# Patient Record
Sex: MALE | Race: WHITE | Employment: OTHER
[De-identification: names, ages, dates, MRNs, and addresses within clinical notes are randomized per-mention and may not be internally consistent; named-entity substitution may affect disease eponyms.]

---

## 2022-03-18 PROBLEM — N39.41 URGE INCONTINENCE: Status: ACTIVE | Noted: 2019-04-10

## 2022-03-19 PROBLEM — R35.0 URINARY FREQUENCY: Status: ACTIVE | Noted: 2019-04-09

## 2022-03-19 PROBLEM — Z86.69 HISTORY OF COMPLEX PARTIAL EPILEPSY: Status: ACTIVE | Noted: 2017-03-09

## 2022-03-19 PROBLEM — L08.9 INFECTED SEBACEOUS CYST OF SKIN: Status: ACTIVE | Noted: 2020-11-12

## 2022-03-19 PROBLEM — L72.3 INFECTED SEBACEOUS CYST OF SKIN: Status: ACTIVE | Noted: 2020-11-12

## 2022-03-19 PROBLEM — G31.84 MCI (MILD COGNITIVE IMPAIRMENT): Status: ACTIVE | Noted: 2018-04-13

## 2022-03-19 PROBLEM — I25.2 HISTORY OF ST ELEVATION MYOCARDIAL INFARCTION (STEMI): Status: ACTIVE | Noted: 2019-07-24

## 2022-03-19 PROBLEM — R39.12 WEAK URINARY STREAM: Status: ACTIVE | Noted: 2019-04-10

## 2022-03-19 PROBLEM — R07.9 CHEST PAIN: Status: ACTIVE | Noted: 2019-04-10

## 2022-03-19 PROBLEM — N40.1 ENLARGED PROSTATE WITH LOWER URINARY TRACT SYMPTOMS (LUTS): Status: ACTIVE | Noted: 2019-04-09

## 2022-03-19 PROBLEM — G60.9 IDIOPATHIC PERIPHERAL NEUROPATHY: Status: ACTIVE | Noted: 2017-03-09

## 2022-03-19 PROBLEM — Z97.8 CHRONIC INDWELLING FOLEY CATHETER: Status: ACTIVE | Noted: 2019-11-18

## 2022-03-19 PROBLEM — A60.00 GENITAL HERPES: Status: ACTIVE | Noted: 2019-04-10

## 2022-03-20 PROBLEM — R35.1 NOCTURIA: Status: ACTIVE | Noted: 2019-04-10

## 2022-03-20 PROBLEM — R06.02 SHORTNESS OF BREATH: Status: ACTIVE | Noted: 2019-04-10

## 2022-03-20 PROBLEM — I48.91 ATRIAL FIBRILLATION WITH RAPID VENTRICULAR RESPONSE (HCC): Status: ACTIVE | Noted: 2019-07-24

## 2023-01-01 ENCOUNTER — APPOINTMENT (OUTPATIENT)
Facility: HOSPITAL | Age: 83
DRG: 698 | End: 2023-01-01
Payer: MEDICARE

## 2023-01-01 ENCOUNTER — HOSPITAL ENCOUNTER (INPATIENT)
Facility: HOSPITAL | Age: 83
LOS: 7 days | DRG: 698 | End: 2023-06-20
Attending: STUDENT IN AN ORGANIZED HEALTH CARE EDUCATION/TRAINING PROGRAM | Admitting: INTERNAL MEDICINE
Payer: MEDICARE

## 2023-01-01 ENCOUNTER — APPOINTMENT (OUTPATIENT)
Facility: HOSPITAL | Age: 83
DRG: 698 | End: 2023-01-01
Attending: INTERNAL MEDICINE
Payer: MEDICARE

## 2023-01-01 VITALS
DIASTOLIC BLOOD PRESSURE: 50 MMHG | WEIGHT: 158.5 LBS | HEART RATE: 66 BPM | TEMPERATURE: 99 F | SYSTOLIC BLOOD PRESSURE: 97 MMHG | OXYGEN SATURATION: 98 % | BODY MASS INDEX: 24.02 KG/M2 | RESPIRATION RATE: 28 BRPM | HEIGHT: 68 IN

## 2023-01-01 DIAGNOSIS — N39.0 URINARY TRACT INFECTION WITHOUT HEMATURIA, SITE UNSPECIFIED: Primary | ICD-10-CM

## 2023-01-01 DIAGNOSIS — I25.9 CHEST PAIN DUE TO MYOCARDIAL ISCHEMIA, UNSPECIFIED ISCHEMIC CHEST PAIN TYPE: ICD-10-CM

## 2023-01-01 DIAGNOSIS — I48.91 ATRIAL FIBRILLATION WITH RAPID VENTRICULAR RESPONSE (HCC): ICD-10-CM

## 2023-01-01 DIAGNOSIS — R06.02 SHORTNESS OF BREATH: ICD-10-CM

## 2023-01-01 DIAGNOSIS — A41.9 SEPTICEMIA (HCC): ICD-10-CM

## 2023-01-01 LAB
ACCESSION NUMBER, LLC1M: ABNORMAL
ACINETOBACTER CALCOAC BAUMANNII COMPLEX BY PCR: NOT DETECTED
ALBUMIN SERPL-MCNC: 2.2 G/DL (ref 3.4–5)
ALBUMIN SERPL-MCNC: 2.3 G/DL (ref 3.4–5)
ALBUMIN SERPL-MCNC: 2.7 G/DL (ref 3.4–5)
ALBUMIN SERPL-MCNC: 3.7 G/DL (ref 3.4–5)
ALBUMIN SERPL-MCNC: 3.7 G/DL (ref 3.4–5)
ALBUMIN SERPL-MCNC: 4.1 G/DL (ref 3.4–5)
ALBUMIN SERPL-MCNC: 4.3 G/DL (ref 3.4–5)
ALBUMIN/GLOB SERPL: 0.6 (ref 0.8–1.7)
ALBUMIN/GLOB SERPL: 0.6 (ref 0.8–1.7)
ALBUMIN/GLOB SERPL: 1.2 (ref 0.8–1.7)
ALBUMIN/GLOB SERPL: 1.2 (ref 0.8–1.7)
ALBUMIN/GLOB SERPL: 1.3 (ref 0.8–1.7)
ALP SERPL-CCNC: 102 U/L (ref 45–117)
ALP SERPL-CCNC: 109 U/L (ref 45–117)
ALP SERPL-CCNC: 144 U/L (ref 45–117)
ALP SERPL-CCNC: 162 U/L (ref 45–117)
ALP SERPL-CCNC: 93 U/L (ref 45–117)
ALT SERPL-CCNC: 10 U/L (ref 16–61)
ALT SERPL-CCNC: 11 U/L (ref 16–61)
ALT SERPL-CCNC: 11 U/L (ref 16–61)
ALT SERPL-CCNC: 12 U/L (ref 16–61)
ALT SERPL-CCNC: 13 U/L (ref 16–61)
ANION GAP SERPL CALC-SCNC: 10 MMOL/L (ref 3–18)
ANION GAP SERPL CALC-SCNC: 12 MMOL/L (ref 3–18)
ANION GAP SERPL CALC-SCNC: 13 MMOL/L (ref 3–18)
ANION GAP SERPL CALC-SCNC: 14 MMOL/L (ref 3–18)
ANION GAP SERPL CALC-SCNC: 15 MMOL/L (ref 3–18)
ANION GAP SERPL CALC-SCNC: 16 MMOL/L (ref 3–18)
ANION GAP SERPL CALC-SCNC: 6 MMOL/L (ref 3–18)
ANION GAP SERPL CALC-SCNC: 8 MMOL/L (ref 3–18)
ANION GAP SERPL CALC-SCNC: 8 MMOL/L (ref 3–18)
ANION GAP SERPL CALC-SCNC: 9 MMOL/L (ref 3–18)
APPEARANCE UR: ABNORMAL
AST SERPL-CCNC: 48 U/L (ref 10–38)
AST SERPL-CCNC: 58 U/L (ref 10–38)
AST SERPL-CCNC: 62 U/L (ref 10–38)
AST SERPL-CCNC: 64 U/L (ref 10–38)
AST SERPL-CCNC: 69 U/L (ref 10–38)
B FRAGILIS DNA BLD POS QL NAA+NON-PROBE: NOT DETECTED
BACTERIA SPEC CULT: ABNORMAL
BACTERIA SPEC CULT: NORMAL
BACTERIA SPEC CULT: NORMAL
BACTERIA URNS QL MICRO: ABNORMAL /HPF
BASOPHILS # BLD: 0 K/UL (ref 0–0.1)
BASOPHILS # BLD: 0 K/UL (ref 0–0.1)
BASOPHILS NFR BLD: 0 % (ref 0–2)
BASOPHILS NFR BLD: 0 % (ref 0–2)
BILIRUB SERPL-MCNC: 0.5 MG/DL (ref 0.2–1)
BILIRUB SERPL-MCNC: 0.5 MG/DL (ref 0.2–1)
BILIRUB SERPL-MCNC: 0.7 MG/DL (ref 0.2–1)
BILIRUB SERPL-MCNC: 0.8 MG/DL (ref 0.2–1)
BILIRUB SERPL-MCNC: 1 MG/DL (ref 0.2–1)
BILIRUB UR QL: NEGATIVE
BIOFIRE TEST COMMENT: ABNORMAL
BUN SERPL-MCNC: 107 MG/DL (ref 7–18)
BUN SERPL-MCNC: 119 MG/DL (ref 7–18)
BUN SERPL-MCNC: 120 MG/DL (ref 7–18)
BUN SERPL-MCNC: 125 MG/DL (ref 7–18)
BUN SERPL-MCNC: 140 MG/DL (ref 7–18)
BUN SERPL-MCNC: 77 MG/DL (ref 7–18)
BUN SERPL-MCNC: 81 MG/DL (ref 7–18)
BUN SERPL-MCNC: 90 MG/DL (ref 7–18)
BUN SERPL-MCNC: 92 MG/DL (ref 7–18)
BUN SERPL-MCNC: 97 MG/DL (ref 7–18)
BUN/CREAT SERPL: 11 (ref 12–20)
BUN/CREAT SERPL: 13 (ref 12–20)
BUN/CREAT SERPL: 15 (ref 12–20)
BUN/CREAT SERPL: 17 (ref 12–20)
BUN/CREAT SERPL: 18 (ref 12–20)
BUN/CREAT SERPL: 19 (ref 12–20)
BUN/CREAT SERPL: 20 (ref 12–20)
BUN/CREAT SERPL: 20 (ref 12–20)
BUN/CREAT SERPL: 23 (ref 12–20)
BUN/CREAT SERPL: 24 (ref 12–20)
C ALBICANS DNA BLD POS QL NAA+NON-PROBE: NOT DETECTED
C AURIS DNA BLD POS QL NAA+NON-PROBE: NOT DETECTED
C GATTII+NEOFOR DNA BLD POS QL NAA+N-PRB: NOT DETECTED
C GLABRATA DNA BLD POS QL NAA+NON-PROBE: NOT DETECTED
C KRUSEI DNA BLD POS QL NAA+NON-PROBE: NOT DETECTED
C PARAP DNA BLD POS QL NAA+NON-PROBE: DETECTED
C TROPICLS DNA BLD POS QL NAA+NON-PROBE: DETECTED
CALCIUM SERPL-MCNC: 7.9 MG/DL (ref 8.5–10.1)
CALCIUM SERPL-MCNC: 8 MG/DL (ref 8.5–10.1)
CALCIUM SERPL-MCNC: 8.2 MG/DL (ref 8.5–10.1)
CALCIUM SERPL-MCNC: 8.5 MG/DL (ref 8.5–10.1)
CALCIUM SERPL-MCNC: 8.6 MG/DL (ref 8.5–10.1)
CALCIUM SERPL-MCNC: 8.8 MG/DL (ref 8.5–10.1)
CALCIUM SERPL-MCNC: 8.8 MG/DL (ref 8.5–10.1)
CALCIUM SERPL-MCNC: 8.9 MG/DL (ref 8.5–10.1)
CALCIUM SERPL-MCNC: 9 MG/DL (ref 8.5–10.1)
CALCIUM SERPL-MCNC: 9 MG/DL (ref 8.5–10.1)
CC UR VC: ABNORMAL
CC UR VC: ABNORMAL
CHLORIDE SERPL-SCNC: 102 MMOL/L (ref 100–111)
CHLORIDE SERPL-SCNC: 110 MMOL/L (ref 100–111)
CHLORIDE SERPL-SCNC: 111 MMOL/L (ref 100–111)
CHLORIDE SERPL-SCNC: 113 MMOL/L (ref 100–111)
CHLORIDE SERPL-SCNC: 113 MMOL/L (ref 100–111)
CHLORIDE SERPL-SCNC: 114 MMOL/L (ref 100–111)
CHLORIDE SERPL-SCNC: 116 MMOL/L (ref 100–111)
CHLORIDE SERPL-SCNC: 119 MMOL/L (ref 100–111)
CHLORIDE SERPL-SCNC: 120 MMOL/L (ref 100–111)
CHLORIDE SERPL-SCNC: 122 MMOL/L (ref 100–111)
CK SERPL-CCNC: 38 U/L (ref 39–308)
CO2 SERPL-SCNC: 17 MMOL/L (ref 21–32)
CO2 SERPL-SCNC: 17 MMOL/L (ref 21–32)
CO2 SERPL-SCNC: 18 MMOL/L (ref 21–32)
CO2 SERPL-SCNC: 18 MMOL/L (ref 21–32)
CO2 SERPL-SCNC: 19 MMOL/L (ref 21–32)
CO2 SERPL-SCNC: 20 MMOL/L (ref 21–32)
CO2 SERPL-SCNC: 20 MMOL/L (ref 21–32)
CO2 SERPL-SCNC: 21 MMOL/L (ref 21–32)
CO2 SERPL-SCNC: 22 MMOL/L (ref 21–32)
CO2 SERPL-SCNC: 23 MMOL/L (ref 21–32)
COLOR UR: ABNORMAL
CREAT SERPL-MCNC: 12.5 MG/DL (ref 0.6–1.3)
CREAT SERPL-MCNC: 3.19 MG/DL (ref 0.6–1.3)
CREAT SERPL-MCNC: 3.97 MG/DL (ref 0.6–1.3)
CREAT SERPL-MCNC: 4.03 MG/DL (ref 0.6–1.3)
CREAT SERPL-MCNC: 5 MG/DL (ref 0.6–1.3)
CREAT SERPL-MCNC: 5.24 MG/DL (ref 0.6–1.3)
CREAT SERPL-MCNC: 5.66 MG/DL (ref 0.6–1.3)
CREAT SERPL-MCNC: 6.25 MG/DL (ref 0.6–1.3)
CREAT SERPL-MCNC: 8.01 MG/DL (ref 0.6–1.3)
CREAT SERPL-MCNC: 9.43 MG/DL (ref 0.6–1.3)
DIFFERENTIAL METHOD BLD: ABNORMAL
DIFFERENTIAL METHOD BLD: ABNORMAL
E CLOAC COMP DNA BLD POS NAA+NON-PROBE: NOT DETECTED
E COLI DNA BLD POS QL NAA+NON-PROBE: NOT DETECTED
E FAECALIS DNA BLD POS QL NAA+NON-PROBE: NOT DETECTED
E FAECIUM DNA BLD POS QL NAA+NON-PROBE: NOT DETECTED
ECHO AO ROOT DIAM: 3.9 CM
ECHO AO ROOT INDEX: 2.12 CM/M2
ECHO AV AREA PEAK VELOCITY: 2.1 CM2
ECHO AV AREA VTI: 2 CM2
ECHO AV AREA/BSA PEAK VELOCITY: 1.1 CM2/M2
ECHO AV AREA/BSA VTI: 1.1 CM2/M2
ECHO AV MEAN GRADIENT: 3 MMHG
ECHO AV MEAN VELOCITY: 0.8 M/S
ECHO AV PEAK GRADIENT: 7 MMHG
ECHO AV PEAK VELOCITY: 1.3 M/S
ECHO AV VELOCITY RATIO: 0.77
ECHO AV VTI: 25.7 CM
ECHO BSA: 1.84 M2
ECHO BSA: 1.84 M2
ECHO EST RA PRESSURE: 15 MMHG
ECHO LA DIAMETER INDEX: 2.5 CM/M2
ECHO LA DIAMETER: 4.6 CM
ECHO LA TO AORTIC ROOT RATIO: 1.18
ECHO LA VOL 2C: 82 ML (ref 18–58)
ECHO LA VOL 2C: 83 ML (ref 18–58)
ECHO LA VOL 4C: 101 ML (ref 18–58)
ECHO LA VOL 4C: 95 ML (ref 18–58)
ECHO LA VOL BP: 96 ML (ref 18–58)
ECHO LA VOL/BSA BIPLANE: 52 ML/M2 (ref 16–34)
ECHO LA VOLUME AREA LENGTH: 101 ML
ECHO LA VOLUME INDEX AREA LENGTH: 55 ML/M2 (ref 16–34)
ECHO LV E' LATERAL VELOCITY: 13 CM/S
ECHO LV E' SEPTAL VELOCITY: 10 CM/S
ECHO LV EDV A2C: 119 ML
ECHO LV EDV A4C: 123 ML
ECHO LV EDV BP: 123 ML (ref 67–155)
ECHO LV EDV INDEX A4C: 67 ML/M2
ECHO LV EDV INDEX BP: 67 ML/M2
ECHO LV EDV NDEX A2C: 65 ML/M2
ECHO LV EJECTION FRACTION A2C: 44 %
ECHO LV EJECTION FRACTION A4C: 48 %
ECHO LV EJECTION FRACTION BIPLANE: 46 % (ref 55–100)
ECHO LV ESV A2C: 67 ML
ECHO LV ESV A4C: 64 ML
ECHO LV ESV BP: 66 ML (ref 22–58)
ECHO LV ESV INDEX A2C: 36 ML/M2
ECHO LV ESV INDEX A4C: 35 ML/M2
ECHO LV ESV INDEX BP: 36 ML/M2
ECHO LV FRACTIONAL SHORTENING: 34 % (ref 28–44)
ECHO LV GLOBAL LONGITUDINAL STRAIN (GLS): -10.9 %
ECHO LV GLOBAL LONGITUDINAL STRAIN (GLS): -12.4 %
ECHO LV GLOBAL LONGITUDINAL STRAIN (GLS): -12.4 %
ECHO LV GLOBAL LONGITUDINAL STRAIN (GLS): -13.9 %
ECHO LV GLOBAL LONGITUDINAL STRAIN (GLS): -27.9 %
ECHO LV INTERNAL DIMENSION DIASTOLE INDEX: 3.04 CM/M2
ECHO LV INTERNAL DIMENSION DIASTOLIC: 5.6 CM (ref 4.2–5.9)
ECHO LV INTERNAL DIMENSION SYSTOLIC INDEX: 2.01 CM/M2
ECHO LV INTERNAL DIMENSION SYSTOLIC: 3.7 CM
ECHO LV ISOVOLUMETRIC RELAXATION TIME (IVRT): 88.5 MS
ECHO LV IVSD: 0.7 CM (ref 0.6–1)
ECHO LV MASS 2D: 152.3 G (ref 88–224)
ECHO LV MASS INDEX 2D: 82.8 G/M2 (ref 49–115)
ECHO LV POSTERIOR WALL DIASTOLIC: 0.8 CM (ref 0.6–1)
ECHO LV RELATIVE WALL THICKNESS RATIO: 0.29
ECHO LVOT AREA: 2.8 CM2
ECHO LVOT AV VTI INDEX: 0.74
ECHO LVOT DIAM: 1.9 CM
ECHO LVOT MEAN GRADIENT: 2 MMHG
ECHO LVOT PEAK GRADIENT: 4 MMHG
ECHO LVOT PEAK VELOCITY: 1 M/S
ECHO LVOT STROKE VOLUME INDEX: 29.4 ML/M2
ECHO LVOT SV: 54.1 ML
ECHO LVOT VTI: 19.1 CM
ECHO MV A VELOCITY: 0.5 M/S
ECHO MV E DECELERATION TIME (DT): 162.7 MS
ECHO MV E VELOCITY: 1.08 M/S
ECHO MV E/A RATIO: 2.16
ECHO MV E/E' LATERAL: 8.31
ECHO MV E/E' RATIO (AVERAGED): 9.55
ECHO MV E/E' SEPTAL: 10.8
ECHO PULMONARY ARTERY SYSTOLIC PRESSURE (PASP): 65 MMHG
ECHO PVEIN A DURATION: 85.6 MS
ECHO PVEIN A VELOCITY: 0.3 M/S
ECHO RA VOLUME: 24 ML
ECHO RA VOLUME: 24 ML
ECHO RIGHT VENTRICULAR SYSTOLIC PRESSURE (RVSP): 65 MMHG
ECHO RV FREE WALL PEAK S': 16 CM/S
ECHO RV GLOBAL SYSTOLIC STRAIN (GLS): -30.5 %
ECHO RV TAPSE: 3 CM (ref 1.7–?)
ECHO RV TAPSE: 3.1 CM (ref 1.7–?)
ECHO TRICUSPID ANNULAR PEAK SYSTOLIC VELOCITY: 16 CM/S
ECHO TV REGURGITANT MAX VELOCITY: 3.53 M/S
ECHO TV REGURGITANT PEAK GRADIENT: 50 MMHG
EKG ATRIAL RATE: 61 BPM
EKG DIAGNOSIS: NORMAL
EKG DIAGNOSIS: NORMAL
EKG P AXIS: 93 DEGREES
EKG P-R INTERVAL: 178 MS
EKG Q-T INTERVAL: 368 MS
EKG Q-T INTERVAL: 464 MS
EKG QRS DURATION: 104 MS
EKG QRS DURATION: 90 MS
EKG QTC CALCULATION (BAZETT): 467 MS
EKG QTC CALCULATION (BAZETT): 515 MS
EKG R AXIS: -15 DEGREES
EKG R AXIS: -40 DEGREES
EKG T AXIS: 108 DEGREES
EKG T AXIS: 118 DEGREES
EKG VENTRICULAR RATE: 118 BPM
EKG VENTRICULAR RATE: 61 BPM
ENTEROBACTERALES DNA BLD POS NAA+N-PRB: NOT DETECTED
EOSINOPHIL # BLD: 0 K/UL (ref 0–0.4)
EOSINOPHIL # BLD: 0 K/UL (ref 0–0.4)
EOSINOPHIL NFR BLD: 0 % (ref 0–5)
EOSINOPHIL NFR BLD: 0 % (ref 0–5)
EPITH CASTS URNS QL MICRO: ABNORMAL /LPF (ref 0–5)
ERYTHROCYTE [DISTWIDTH] IN BLOOD BY AUTOMATED COUNT: 14 % (ref 11.6–14.5)
ERYTHROCYTE [DISTWIDTH] IN BLOOD BY AUTOMATED COUNT: 14 % (ref 11.6–14.5)
ERYTHROCYTE [DISTWIDTH] IN BLOOD BY AUTOMATED COUNT: 14.1 % (ref 11.6–14.5)
ERYTHROCYTE [DISTWIDTH] IN BLOOD BY AUTOMATED COUNT: 14.6 % (ref 11.6–14.5)
ERYTHROCYTE [DISTWIDTH] IN BLOOD BY AUTOMATED COUNT: 14.7 % (ref 11.6–14.5)
FERRITIN SERPL-MCNC: 959 NG/ML (ref 8–388)
FLUAV RNA SPEC QL NAA+PROBE: NOT DETECTED
FLUBV RNA SPEC QL NAA+PROBE: NOT DETECTED
GLOBULIN SER CALC-MCNC: 3.1 G/DL (ref 2–4)
GLOBULIN SER CALC-MCNC: 3.1 G/DL (ref 2–4)
GLOBULIN SER CALC-MCNC: 3.2 G/DL (ref 2–4)
GLOBULIN SER CALC-MCNC: 3.7 G/DL (ref 2–4)
GLOBULIN SER CALC-MCNC: 4 G/DL (ref 2–4)
GLUCOSE BLD STRIP.AUTO-MCNC: 121 MG/DL (ref 70–110)
GLUCOSE BLD STRIP.AUTO-MCNC: 126 MG/DL (ref 70–110)
GLUCOSE BLD STRIP.AUTO-MCNC: 179 MG/DL (ref 70–110)
GLUCOSE BLD STRIP.AUTO-MCNC: 72 MG/DL (ref 70–110)
GLUCOSE BLD STRIP.AUTO-MCNC: 84 MG/DL (ref 70–110)
GLUCOSE SERPL-MCNC: 106 MG/DL (ref 74–99)
GLUCOSE SERPL-MCNC: 127 MG/DL (ref 74–99)
GLUCOSE SERPL-MCNC: 143 MG/DL (ref 74–99)
GLUCOSE SERPL-MCNC: 147 MG/DL (ref 74–99)
GLUCOSE SERPL-MCNC: 68 MG/DL (ref 74–99)
GLUCOSE SERPL-MCNC: 71 MG/DL (ref 74–99)
GLUCOSE SERPL-MCNC: 81 MG/DL (ref 74–99)
GLUCOSE SERPL-MCNC: 87 MG/DL (ref 74–99)
GLUCOSE SERPL-MCNC: 91 MG/DL (ref 74–99)
GLUCOSE SERPL-MCNC: 93 MG/DL (ref 74–99)
GLUCOSE UR STRIP.AUTO-MCNC: NEGATIVE MG/DL
GP B STREP DNA BLD POS QL NAA+NON-PROBE: NOT DETECTED
GRAM STN SPEC: ABNORMAL
HAEM INFLU DNA BLD POS QL NAA+NON-PROBE: NOT DETECTED
HCT VFR BLD AUTO: 22.7 % (ref 36–48)
HCT VFR BLD AUTO: 23.4 % (ref 36–48)
HCT VFR BLD AUTO: 23.7 % (ref 36–48)
HCT VFR BLD AUTO: 25.4 % (ref 36–48)
HCT VFR BLD AUTO: 28.9 % (ref 36–48)
HGB BLD-MCNC: 7.3 G/DL (ref 13–16)
HGB BLD-MCNC: 7.5 G/DL (ref 13–16)
HGB BLD-MCNC: 7.6 G/DL (ref 13–16)
HGB BLD-MCNC: 8.4 G/DL (ref 13–16)
HGB BLD-MCNC: 9.7 G/DL (ref 13–16)
HGB UR QL STRIP: ABNORMAL
IMM GRANULOCYTES # BLD AUTO: 0 K/UL
IMM GRANULOCYTES # BLD AUTO: 0.1 K/UL (ref 0–0.04)
IMM GRANULOCYTES NFR BLD AUTO: 0 %
IMM GRANULOCYTES NFR BLD AUTO: 1 % (ref 0–0.5)
INR PPP: 1.3 (ref 0.8–1.2)
INR PPP: 1.3 (ref 0.8–1.2)
IRON SATN MFR SERPL: 45 % (ref 20–50)
IRON SERPL-MCNC: 52 UG/DL (ref 50–175)
K OXYTOCA DNA BLD POS QL NAA+NON-PROBE: NOT DETECTED
KETONES UR QL STRIP.AUTO: ABNORMAL MG/DL
KLEBSIELLA SP DNA BLD POS QL NAA+NON-PRB: NOT DETECTED
KLEBSIELLA SP DNA BLD POS QL NAA+NON-PRB: NOT DETECTED
L MONOCYTOG DNA BLD POS QL NAA+NON-PROBE: NOT DETECTED
LACTATE BLD-SCNC: 0.66 MMOL/L (ref 0.4–2)
LACTATE SERPL-SCNC: 0.8 MMOL/L (ref 0.4–2)
LACTATE SERPL-SCNC: 0.9 MMOL/L (ref 0.4–2)
LEUKOCYTE ESTERASE UR QL STRIP.AUTO: ABNORMAL
LYMPHOCYTES # BLD: 0.4 K/UL (ref 0.9–3.6)
LYMPHOCYTES # BLD: 0.9 K/UL (ref 0.9–3.6)
LYMPHOCYTES NFR BLD: 2 % (ref 21–52)
LYMPHOCYTES NFR BLD: 9 % (ref 21–52)
MAGNESIUM SERPL-MCNC: 2.2 MG/DL (ref 1.6–2.6)
MAGNESIUM SERPL-MCNC: 2.3 MG/DL (ref 1.6–2.6)
MAGNESIUM SERPL-MCNC: 2.3 MG/DL (ref 1.6–2.6)
MAGNESIUM SERPL-MCNC: 2.9 MG/DL (ref 1.6–2.6)
MCH RBC QN AUTO: 30.5 PG (ref 24–34)
MCH RBC QN AUTO: 30.9 PG (ref 24–34)
MCH RBC QN AUTO: 31 PG (ref 24–34)
MCH RBC QN AUTO: 31.1 PG (ref 24–34)
MCH RBC QN AUTO: 31.2 PG (ref 24–34)
MCHC RBC AUTO-ENTMCNC: 32.1 G/DL (ref 31–37)
MCHC RBC AUTO-ENTMCNC: 32.1 G/DL (ref 31–37)
MCHC RBC AUTO-ENTMCNC: 32.2 G/DL (ref 31–37)
MCHC RBC AUTO-ENTMCNC: 33.1 G/DL (ref 31–37)
MCHC RBC AUTO-ENTMCNC: 33.6 G/DL (ref 31–37)
MCV RBC AUTO: 92 FL (ref 78–100)
MCV RBC AUTO: 94.1 FL (ref 78–100)
MCV RBC AUTO: 95.2 FL (ref 78–100)
MCV RBC AUTO: 96.7 FL (ref 78–100)
MCV RBC AUTO: 97 FL (ref 78–100)
MONOCYTES # BLD: 0.6 K/UL (ref 0.05–1.2)
MONOCYTES # BLD: 0.9 K/UL (ref 0.05–1.2)
MONOCYTES NFR BLD: 5 % (ref 3–10)
MONOCYTES NFR BLD: 6 % (ref 3–10)
N MEN DNA BLD POS QL NAA+NON-PROBE: NOT DETECTED
NEUTS BAND NFR BLD MANUAL: 6 % (ref 0–5)
NEUTS SEG # BLD: 17.2 K/UL (ref 1.8–8)
NEUTS SEG # BLD: 7.7 K/UL (ref 1.8–8)
NEUTS SEG NFR BLD: 83 % (ref 40–73)
NEUTS SEG NFR BLD: 87 % (ref 40–73)
NITRITE UR QL STRIP.AUTO: NEGATIVE
NRBC # BLD: 0 K/UL (ref 0–0.01)
NRBC BLD-RTO: 0 PER 100 WBC
P AERUGINOSA DNA BLD POS NAA+NON-PROBE: NOT DETECTED
PH UR STRIP: 6.5 (ref 5–8)
PHOSPHATE SERPL-MCNC: 3.7 MG/DL (ref 2.5–4.9)
PHOSPHATE SERPL-MCNC: 5.6 MG/DL (ref 2.5–4.9)
PLATELET # BLD AUTO: 153 K/UL (ref 135–420)
PLATELET # BLD AUTO: 157 K/UL (ref 135–420)
PLATELET # BLD AUTO: 170 K/UL (ref 135–420)
PLATELET # BLD AUTO: 178 K/UL (ref 135–420)
PLATELET # BLD AUTO: 217 K/UL (ref 135–420)
PLATELET COMMENT: ABNORMAL
PMV BLD AUTO: 8.7 FL (ref 9.2–11.8)
PMV BLD AUTO: 9 FL (ref 9.2–11.8)
PMV BLD AUTO: 9.1 FL (ref 9.2–11.8)
PMV BLD AUTO: 9.3 FL (ref 9.2–11.8)
PMV BLD AUTO: 9.7 FL (ref 9.2–11.8)
POTASSIUM SERPL-SCNC: 3.2 MMOL/L (ref 3.5–5.5)
POTASSIUM SERPL-SCNC: 3.3 MMOL/L (ref 3.5–5.5)
POTASSIUM SERPL-SCNC: 3.3 MMOL/L (ref 3.5–5.5)
POTASSIUM SERPL-SCNC: 3.5 MMOL/L (ref 3.5–5.5)
POTASSIUM SERPL-SCNC: 3.6 MMOL/L (ref 3.5–5.5)
POTASSIUM SERPL-SCNC: 3.6 MMOL/L (ref 3.5–5.5)
POTASSIUM SERPL-SCNC: 3.7 MMOL/L (ref 3.5–5.5)
POTASSIUM SERPL-SCNC: 3.7 MMOL/L (ref 3.5–5.5)
POTASSIUM SERPL-SCNC: 4.4 MMOL/L (ref 3.5–5.5)
POTASSIUM SERPL-SCNC: 4.5 MMOL/L (ref 3.5–5.5)
POTASSIUM SERPL-SCNC: 4.5 MMOL/L (ref 3.5–5.5)
POTASSIUM SERPL-SCNC: 5 MMOL/L (ref 3.5–5.5)
POTASSIUM SERPL-SCNC: 5.3 MMOL/L (ref 3.5–5.5)
PROCALCITONIN SERPL-MCNC: 0.79 NG/ML
PROCALCITONIN SERPL-MCNC: 1.57 NG/ML
PROT SERPL-MCNC: 5.9 G/DL (ref 6.4–8.2)
PROT SERPL-MCNC: 6.3 G/DL (ref 6.4–8.2)
PROT SERPL-MCNC: 6.8 G/DL (ref 6.4–8.2)
PROT SERPL-MCNC: 6.9 G/DL (ref 6.4–8.2)
PROT SERPL-MCNC: 7.2 G/DL (ref 6.4–8.2)
PROT UR STRIP-MCNC: 300 MG/DL
PROTEUS SP DNA BLD POS QL NAA+NON-PROBE: NOT DETECTED
PROTHROMBIN TIME: 16.1 SEC (ref 11.5–15.2)
PROTHROMBIN TIME: 17 SEC (ref 11.5–15.2)
RBC # BLD AUTO: 2.34 M/UL (ref 4.35–5.65)
RBC # BLD AUTO: 2.42 M/UL (ref 4.35–5.65)
RBC # BLD AUTO: 2.49 M/UL (ref 4.35–5.65)
RBC # BLD AUTO: 2.7 M/UL (ref 4.35–5.65)
RBC # BLD AUTO: 3.14 M/UL (ref 4.35–5.65)
RBC #/AREA URNS HPF: ABNORMAL /HPF (ref 0–5)
RBC MORPH BLD: ABNORMAL
RESISTANT GENE TARGETS: ABNORMAL
S AUREUS DNA BLD POS QL NAA+NON-PROBE: NOT DETECTED
S AUREUS+CONS DNA BLD POS NAA+NON-PROBE: NOT DETECTED
S EPIDERMIDIS DNA BLD POS QL NAA+NON-PRB: NOT DETECTED
S LUGDUNENSIS DNA BLD POS QL NAA+NON-PRB: NOT DETECTED
S MALTOPHILIA DNA BLD POS QL NAA+NON-PRB: NOT DETECTED
S MARCESCENS DNA BLD POS NAA+NON-PROBE: NOT DETECTED
S PNEUM AG UR QL: NEGATIVE
S PNEUM DNA BLD POS QL NAA+NON-PROBE: NOT DETECTED
S PYO DNA BLD POS QL NAA+NON-PROBE: NOT DETECTED
SALMONELLA DNA BLD POS QL NAA+NON-PROBE: NOT DETECTED
SARS-COV-2 RNA RESP QL NAA+PROBE: NOT DETECTED
SERVICE CMNT-IMP: ABNORMAL
SERVICE CMNT-IMP: NORMAL
SERVICE CMNT-IMP: NORMAL
SODIUM SERPL-SCNC: 138 MMOL/L (ref 136–145)
SODIUM SERPL-SCNC: 141 MMOL/L (ref 136–145)
SODIUM SERPL-SCNC: 142 MMOL/L (ref 136–145)
SODIUM SERPL-SCNC: 145 MMOL/L (ref 136–145)
SODIUM SERPL-SCNC: 146 MMOL/L (ref 136–145)
SODIUM SERPL-SCNC: 149 MMOL/L (ref 136–145)
SODIUM SERPL-SCNC: 150 MMOL/L (ref 136–145)
SODIUM SERPL-SCNC: 151 MMOL/L (ref 136–145)
SODIUM UR-SCNC: 68 MMOL/L (ref 20–110)
SP GR UR REFRACTOMETRY: 1.01 (ref 1–1.03)
STREPTOCOCCUS DNA BLD POS NAA+NON-PROBE: NOT DETECTED
TIBC SERPL-MCNC: 115 UG/DL (ref 250–450)
TROPONIN I SERPL HS-MCNC: 132 NG/L (ref 0–78)
TROPONIN I SERPL HS-MCNC: 3007 NG/L (ref 0–78)
TROPONIN I SERPL HS-MCNC: 6855 NG/L (ref 0–78)
TROPONIN I SERPL HS-MCNC: 82 NG/L (ref 0–78)
TROPONIN I SERPL HS-MCNC: 84 NG/L (ref 0–78)
TROPONIN I SERPL HS-MCNC: 8868 NG/L (ref 0–78)
UROBILINOGEN UR QL STRIP.AUTO: 0.2 EU/DL (ref 0.2–1)
VANCOMYCIN SERPL-MCNC: 18 UG/ML (ref 5–40)
VANCOMYCIN SERPL-MCNC: 19.4 UG/ML (ref 5–40)
VANCOMYCIN SERPL-MCNC: 8.8 UG/ML (ref 5–40)
WBC # BLD AUTO: 11.8 K/UL (ref 4.6–13.2)
WBC # BLD AUTO: 13.3 K/UL (ref 4.6–13.2)
WBC # BLD AUTO: 18.5 K/UL (ref 4.6–13.2)
WBC # BLD AUTO: 9.4 K/UL (ref 4.6–13.2)
WBC # BLD AUTO: 9.9 K/UL (ref 4.6–13.2)
WBC URNS QL MICRO: ABNORMAL /HPF (ref 0–5)
YEAST URNS QL MICRO: ABNORMAL

## 2023-01-01 PROCEDURE — 6360000002 HC RX W HCPCS: Performed by: FAMILY MEDICINE

## 2023-01-01 PROCEDURE — 36415 COLL VENOUS BLD VENIPUNCTURE: CPT

## 2023-01-01 PROCEDURE — C9113 INJ PANTOPRAZOLE SODIUM, VIA: HCPCS | Performed by: INTERNAL MEDICINE

## 2023-01-01 PROCEDURE — 1100000003 HC PRIVATE W/ TELEMETRY

## 2023-01-01 PROCEDURE — 2500000003 HC RX 250 WO HCPCS: Performed by: FAMILY MEDICINE

## 2023-01-01 PROCEDURE — 80053 COMPREHEN METABOLIC PANEL: CPT

## 2023-01-01 PROCEDURE — 87106 FUNGI IDENTIFICATION YEAST: CPT

## 2023-01-01 PROCEDURE — 2500000003 HC RX 250 WO HCPCS: Performed by: INTERNAL MEDICINE

## 2023-01-01 PROCEDURE — 84145 PROCALCITONIN (PCT): CPT

## 2023-01-01 PROCEDURE — 82962 GLUCOSE BLOOD TEST: CPT

## 2023-01-01 PROCEDURE — 83735 ASSAY OF MAGNESIUM: CPT

## 2023-01-01 PROCEDURE — 93010 ELECTROCARDIOGRAM REPORT: CPT | Performed by: INTERNAL MEDICINE

## 2023-01-01 PROCEDURE — 99221 1ST HOSP IP/OBS SF/LOW 40: CPT | Performed by: INTERNAL MEDICINE

## 2023-01-01 PROCEDURE — 71045 X-RAY EXAM CHEST 1 VIEW: CPT

## 2023-01-01 PROCEDURE — 74176 CT ABD & PELVIS W/O CONTRAST: CPT

## 2023-01-01 PROCEDURE — 6370000000 HC RX 637 (ALT 250 FOR IP): Performed by: INTERNAL MEDICINE

## 2023-01-01 PROCEDURE — 87040 BLOOD CULTURE FOR BACTERIA: CPT

## 2023-01-01 PROCEDURE — 99233 SBSQ HOSP IP/OBS HIGH 50: CPT | Performed by: INTERNAL MEDICINE

## 2023-01-01 PROCEDURE — 2000000000 HC ICU R&B

## 2023-01-01 PROCEDURE — P9047 ALBUMIN (HUMAN), 25%, 50ML: HCPCS | Performed by: FAMILY MEDICINE

## 2023-01-01 PROCEDURE — 2580000003 HC RX 258: Performed by: INTERNAL MEDICINE

## 2023-01-01 PROCEDURE — 6360000002 HC RX W HCPCS: Performed by: INTERNAL MEDICINE

## 2023-01-01 PROCEDURE — 2580000003 HC RX 258: Performed by: FAMILY MEDICINE

## 2023-01-01 PROCEDURE — 96367 TX/PROPH/DG ADDL SEQ IV INF: CPT

## 2023-01-01 PROCEDURE — 51702 INSERT TEMP BLADDER CATH: CPT

## 2023-01-01 PROCEDURE — 85027 COMPLETE CBC AUTOMATED: CPT

## 2023-01-01 PROCEDURE — 83550 IRON BINDING TEST: CPT

## 2023-01-01 PROCEDURE — 96361 HYDRATE IV INFUSION ADD-ON: CPT

## 2023-01-01 PROCEDURE — 80202 ASSAY OF VANCOMYCIN: CPT

## 2023-01-01 PROCEDURE — 2580000003 HC RX 258: Performed by: STUDENT IN AN ORGANIZED HEALTH CARE EDUCATION/TRAINING PROGRAM

## 2023-01-01 PROCEDURE — 81001 URINALYSIS AUTO W/SCOPE: CPT

## 2023-01-01 PROCEDURE — 93306 TTE W/DOPPLER COMPLETE: CPT

## 2023-01-01 PROCEDURE — 87150 DNA/RNA AMPLIFIED PROBE: CPT

## 2023-01-01 PROCEDURE — 80048 BASIC METABOLIC PNL TOTAL CA: CPT

## 2023-01-01 PROCEDURE — 85610 PROTHROMBIN TIME: CPT

## 2023-01-01 PROCEDURE — 96365 THER/PROPH/DIAG IV INF INIT: CPT

## 2023-01-01 PROCEDURE — 82550 ASSAY OF CK (CPK): CPT

## 2023-01-01 PROCEDURE — 6370000000 HC RX 637 (ALT 250 FOR IP): Performed by: NURSE PRACTITIONER

## 2023-01-01 PROCEDURE — 80069 RENAL FUNCTION PANEL: CPT

## 2023-01-01 PROCEDURE — 2700000000 HC OXYGEN THERAPY PER DAY

## 2023-01-01 PROCEDURE — 99232 SBSQ HOSP IP/OBS MODERATE 35: CPT | Performed by: INTERNAL MEDICINE

## 2023-01-01 PROCEDURE — 83605 ASSAY OF LACTIC ACID: CPT

## 2023-01-01 PROCEDURE — 6360000004 HC RX CONTRAST MEDICATION: Performed by: INTERNAL MEDICINE

## 2023-01-01 PROCEDURE — 84132 ASSAY OF SERUM POTASSIUM: CPT

## 2023-01-01 PROCEDURE — A4216 STERILE WATER/SALINE, 10 ML: HCPCS | Performed by: INTERNAL MEDICINE

## 2023-01-01 PROCEDURE — 84484 ASSAY OF TROPONIN QUANT: CPT

## 2023-01-01 PROCEDURE — 82728 ASSAY OF FERRITIN: CPT

## 2023-01-01 PROCEDURE — 76770 US EXAM ABDO BACK WALL COMP: CPT

## 2023-01-01 PROCEDURE — 93005 ELECTROCARDIOGRAM TRACING: CPT | Performed by: STUDENT IN AN ORGANIZED HEALTH CARE EDUCATION/TRAINING PROGRAM

## 2023-01-01 PROCEDURE — 99285 EMERGENCY DEPT VISIT HI MDM: CPT

## 2023-01-01 PROCEDURE — 87186 SC STD MICRODIL/AGAR DIL: CPT

## 2023-01-01 PROCEDURE — 84300 ASSAY OF URINE SODIUM: CPT

## 2023-01-01 PROCEDURE — 85025 COMPLETE CBC W/AUTO DIFF WBC: CPT

## 2023-01-01 PROCEDURE — 93970 EXTREMITY STUDY: CPT

## 2023-01-01 PROCEDURE — 70450 CT HEAD/BRAIN W/O DYE: CPT

## 2023-01-01 PROCEDURE — 87077 CULTURE AEROBIC IDENTIFY: CPT

## 2023-01-01 PROCEDURE — 87086 URINE CULTURE/COLONY COUNT: CPT

## 2023-01-01 PROCEDURE — 93005 ELECTROCARDIOGRAM TRACING: CPT | Performed by: INTERNAL MEDICINE

## 2023-01-01 PROCEDURE — P9047 ALBUMIN (HUMAN), 25%, 50ML: HCPCS | Performed by: INTERNAL MEDICINE

## 2023-01-01 PROCEDURE — 99232 SBSQ HOSP IP/OBS MODERATE 35: CPT | Performed by: NURSE PRACTITIONER

## 2023-01-01 PROCEDURE — 93306 TTE W/DOPPLER COMPLETE: CPT | Performed by: INTERNAL MEDICINE

## 2023-01-01 PROCEDURE — 87449 NOS EACH ORGANISM AG IA: CPT

## 2023-01-01 PROCEDURE — 99223 1ST HOSP IP/OBS HIGH 75: CPT | Performed by: INTERNAL MEDICINE

## 2023-01-01 PROCEDURE — 87636 SARSCOV2 & INF A&B AMP PRB: CPT

## 2023-01-01 PROCEDURE — 93356 MYOCRD STRAIN IMG SPCKL TRCK: CPT | Performed by: INTERNAL MEDICINE

## 2023-01-01 PROCEDURE — 83540 ASSAY OF IRON: CPT

## 2023-01-01 PROCEDURE — 92610 EVALUATE SWALLOWING FUNCTION: CPT

## 2023-01-01 PROCEDURE — 6360000002 HC RX W HCPCS: Performed by: STUDENT IN AN ORGANIZED HEALTH CARE EDUCATION/TRAINING PROGRAM

## 2023-01-01 RX ORDER — SODIUM CHLORIDE 9 MG/ML
INJECTION, SOLUTION INTRAVENOUS ONCE
Status: COMPLETED | OUTPATIENT
Start: 2023-01-01 | End: 2023-01-01

## 2023-01-01 RX ORDER — SODIUM CHLORIDE 0.9 % (FLUSH) 0.9 %
5-40 SYRINGE (ML) INJECTION EVERY 12 HOURS SCHEDULED
Status: DISCONTINUED | OUTPATIENT
Start: 2023-01-01 | End: 2023-01-01

## 2023-01-01 RX ORDER — LINEZOLID 600 MG/1
600 TABLET, FILM COATED ORAL EVERY 12 HOURS SCHEDULED
Status: DISCONTINUED | OUTPATIENT
Start: 2023-01-01 | End: 2023-01-01

## 2023-01-01 RX ORDER — FUROSEMIDE 10 MG/ML
20 INJECTION INTRAMUSCULAR; INTRAVENOUS ONCE
Status: COMPLETED | OUTPATIENT
Start: 2023-01-01 | End: 2023-01-01

## 2023-01-01 RX ORDER — SODIUM CHLORIDE 9 MG/ML
INJECTION, SOLUTION INTRAVENOUS CONTINUOUS
Status: DISCONTINUED | OUTPATIENT
Start: 2023-01-01 | End: 2023-01-01

## 2023-01-01 RX ORDER — MIDODRINE HYDROCHLORIDE 10 MG/1
10 TABLET ORAL
Status: DISCONTINUED | OUTPATIENT
Start: 2023-01-01 | End: 2023-01-01 | Stop reason: HOSPADM

## 2023-01-01 RX ORDER — SODIUM CHLORIDE, SODIUM LACTATE, POTASSIUM CHLORIDE, AND CALCIUM CHLORIDE .6; .31; .03; .02 G/100ML; G/100ML; G/100ML; G/100ML
1000 INJECTION, SOLUTION INTRAVENOUS ONCE
Status: COMPLETED | OUTPATIENT
Start: 2023-01-01 | End: 2023-01-01

## 2023-01-01 RX ORDER — METOPROLOL TARTRATE 5 MG/5ML
1.25 INJECTION INTRAVENOUS ONCE
Status: COMPLETED | OUTPATIENT
Start: 2023-01-01 | End: 2023-01-01

## 2023-01-01 RX ORDER — FLUCONAZOLE 2 MG/ML
400 INJECTION, SOLUTION INTRAVENOUS EVERY 24 HOURS
Status: COMPLETED | OUTPATIENT
Start: 2023-01-01 | End: 2023-01-01

## 2023-01-01 RX ORDER — HEPARIN SODIUM 5000 [USP'U]/ML
5000 INJECTION, SOLUTION INTRAVENOUS; SUBCUTANEOUS EVERY 8 HOURS SCHEDULED
Status: DISCONTINUED | OUTPATIENT
Start: 2023-01-01 | End: 2023-01-01

## 2023-01-01 RX ORDER — MIDODRINE HYDROCHLORIDE 2.5 MG/1
5 TABLET ORAL
Status: COMPLETED | OUTPATIENT
Start: 2023-01-01 | End: 2023-01-01

## 2023-01-01 RX ORDER — ATORVASTATIN CALCIUM 20 MG/1
80 TABLET, FILM COATED ORAL NIGHTLY
Status: DISCONTINUED | OUTPATIENT
Start: 2023-01-01 | End: 2023-01-01

## 2023-01-01 RX ORDER — LOPERAMIDE HYDROCHLORIDE 2 MG/1
2 CAPSULE ORAL EVERY 12 HOURS PRN
COMMUNITY

## 2023-01-01 RX ORDER — MORPHINE SULFATE 20 MG/ML
5 SOLUTION ORAL
Status: DISCONTINUED | OUTPATIENT
Start: 2023-01-01 | End: 2023-01-01 | Stop reason: HOSPADM

## 2023-01-01 RX ORDER — POTASSIUM CHLORIDE 20 MEQ/1
40 TABLET, EXTENDED RELEASE ORAL PRN
Status: DISCONTINUED | OUTPATIENT
Start: 2023-01-01 | End: 2023-01-01

## 2023-01-01 RX ORDER — ALBUMIN (HUMAN) 12.5 G/50ML
25 SOLUTION INTRAVENOUS ONCE
Status: COMPLETED | OUTPATIENT
Start: 2023-01-01 | End: 2023-01-01

## 2023-01-01 RX ORDER — MIDODRINE HYDROCHLORIDE 2.5 MG/1
2.5 TABLET ORAL
Status: DISCONTINUED | OUTPATIENT
Start: 2023-01-01 | End: 2023-01-01

## 2023-01-01 RX ORDER — DEXTROSE MONOHYDRATE 50 MG/ML
INJECTION, SOLUTION INTRAVENOUS CONTINUOUS
Status: DISCONTINUED | OUTPATIENT
Start: 2023-01-01 | End: 2023-01-01

## 2023-01-01 RX ORDER — FLUCONAZOLE 2 MG/ML
200 INJECTION, SOLUTION INTRAVENOUS EVERY 24 HOURS
Status: DISCONTINUED | OUTPATIENT
Start: 2023-01-01 | End: 2023-01-01

## 2023-01-01 RX ORDER — ONDANSETRON 4 MG/1
4 TABLET, ORALLY DISINTEGRATING ORAL EVERY 6 HOURS PRN
Status: DISCONTINUED | OUTPATIENT
Start: 2023-01-01 | End: 2023-01-01 | Stop reason: HOSPADM

## 2023-01-01 RX ORDER — QUETIAPINE FUMARATE 25 MG/1
50 TABLET, FILM COATED ORAL NIGHTLY
Status: DISCONTINUED | OUTPATIENT
Start: 2023-01-01 | End: 2023-01-01 | Stop reason: HOSPADM

## 2023-01-01 RX ORDER — LORAZEPAM 2 MG/ML
0.5 CONCENTRATE ORAL
Status: DISCONTINUED | OUTPATIENT
Start: 2023-01-01 | End: 2023-01-01 | Stop reason: HOSPADM

## 2023-01-01 RX ORDER — FERROUS SULFATE 325(65) MG
325 TABLET ORAL 2 TIMES DAILY
COMMUNITY

## 2023-01-01 RX ORDER — MIDODRINE HYDROCHLORIDE 2.5 MG/1
5 TABLET ORAL
Status: DISCONTINUED | OUTPATIENT
Start: 2023-01-01 | End: 2023-01-01

## 2023-01-01 RX ORDER — ACETAMINOPHEN 650 MG/1
650 SUPPOSITORY RECTAL EVERY 6 HOURS PRN
Status: DISCONTINUED | OUTPATIENT
Start: 2023-01-01 | End: 2023-01-01 | Stop reason: HOSPADM

## 2023-01-01 RX ORDER — POTASSIUM CHLORIDE 7.45 MG/ML
10 INJECTION INTRAVENOUS PRN
Status: DISCONTINUED | OUTPATIENT
Start: 2023-01-01 | End: 2023-01-01

## 2023-01-01 RX ORDER — METOPROLOL TARTRATE 5 MG/5ML
1.25 INJECTION INTRAVENOUS EVERY 6 HOURS
Status: DISCONTINUED | OUTPATIENT
Start: 2023-01-01 | End: 2023-01-01

## 2023-01-01 RX ORDER — ACETAMINOPHEN 325 MG/1
325 TABLET ORAL EVERY 6 HOURS PRN
COMMUNITY

## 2023-01-01 RX ORDER — METOPROLOL TARTRATE 5 MG/5ML
2.5 INJECTION INTRAVENOUS EVERY 6 HOURS PRN
Status: DISCONTINUED | OUTPATIENT
Start: 2023-01-01 | End: 2023-01-01

## 2023-01-01 RX ORDER — BISACODYL 5 MG/1
5 TABLET, DELAYED RELEASE ORAL DAILY PRN
Status: DISCONTINUED | OUTPATIENT
Start: 2023-01-01 | End: 2023-01-01 | Stop reason: HOSPADM

## 2023-01-01 RX ORDER — NOREPINEPHRINE BITARTRATE 0.06 MG/ML
0-30 INJECTION, SOLUTION INTRAVENOUS CONTINUOUS
Status: DISCONTINUED | OUTPATIENT
Start: 2023-01-01 | End: 2023-01-01

## 2023-01-01 RX ORDER — SCOLOPAMINE TRANSDERMAL SYSTEM 1 MG/1
1 PATCH, EXTENDED RELEASE TRANSDERMAL
Status: DISCONTINUED | OUTPATIENT
Start: 2023-01-01 | End: 2023-01-01 | Stop reason: HOSPADM

## 2023-01-01 RX ORDER — METOPROLOL TARTRATE 5 MG/5ML
2.5 INJECTION INTRAVENOUS EVERY 6 HOURS
Status: DISCONTINUED | OUTPATIENT
Start: 2023-01-01 | End: 2023-01-01

## 2023-01-01 RX ORDER — ACETAMINOPHEN 325 MG/1
650 TABLET ORAL EVERY 6 HOURS PRN
Status: DISCONTINUED | OUTPATIENT
Start: 2023-01-01 | End: 2023-01-01 | Stop reason: HOSPADM

## 2023-01-01 RX ORDER — ALBUMIN (HUMAN) 12.5 G/50ML
25 SOLUTION INTRAVENOUS EVERY 6 HOURS
Status: DISCONTINUED | OUTPATIENT
Start: 2023-01-01 | End: 2023-01-01

## 2023-01-01 RX ADMIN — ATORVASTATIN CALCIUM 80 MG: 20 TABLET, FILM COATED ORAL at 21:41

## 2023-01-01 RX ADMIN — HEPARIN SODIUM 5000 UNITS: 5000 INJECTION INTRAVENOUS; SUBCUTANEOUS at 13:36

## 2023-01-01 RX ADMIN — MIDODRINE HYDROCHLORIDE 10 MG: 10 TABLET ORAL at 12:44

## 2023-01-01 RX ADMIN — DEXTROSE MONOHYDRATE: 50 INJECTION, SOLUTION INTRAVENOUS at 13:04

## 2023-01-01 RX ADMIN — ALBUMIN (HUMAN) 25 G: 0.25 INJECTION, SOLUTION INTRAVENOUS at 05:43

## 2023-01-01 RX ADMIN — SODIUM CHLORIDE, PRESERVATIVE FREE 40 MG: 5 INJECTION INTRAVENOUS at 08:48

## 2023-01-01 RX ADMIN — CARBIDOPA AND LEVODOPA 1 TABLET: 25; 100 TABLET ORAL at 10:04

## 2023-01-01 RX ADMIN — METOPROLOL TARTRATE 2.5 MG: 5 INJECTION INTRAVENOUS at 04:29

## 2023-01-01 RX ADMIN — ALBUMIN (HUMAN) 25 G: 0.25 INJECTION, SOLUTION INTRAVENOUS at 10:34

## 2023-01-01 RX ADMIN — MIDODRINE HYDROCHLORIDE 5 MG: 2.5 TABLET ORAL at 14:05

## 2023-01-01 RX ADMIN — METOPROLOL TARTRATE 1.25 MG: 5 INJECTION INTRAVENOUS at 09:41

## 2023-01-01 RX ADMIN — HEPARIN SODIUM 5000 UNITS: 5000 INJECTION INTRAVENOUS; SUBCUTANEOUS at 05:50

## 2023-01-01 RX ADMIN — POTASSIUM BICARBONATE 40 MEQ: 782 TABLET, EFFERVESCENT ORAL at 07:39

## 2023-01-01 RX ADMIN — HEPARIN SODIUM 5000 UNITS: 5000 INJECTION INTRAVENOUS; SUBCUTANEOUS at 21:05

## 2023-01-01 RX ADMIN — SODIUM CHLORIDE: 9 INJECTION, SOLUTION INTRAVENOUS at 11:45

## 2023-01-01 RX ADMIN — FLUCONAZOLE 200 MG: 2 INJECTION, SOLUTION INTRAVENOUS at 10:34

## 2023-01-01 RX ADMIN — FLUCONAZOLE 200 MG: 2 INJECTION, SOLUTION INTRAVENOUS at 09:41

## 2023-01-01 RX ADMIN — VANCOMYCIN HYDROCHLORIDE 750 MG: 750 INJECTION, POWDER, LYOPHILIZED, FOR SOLUTION INTRAVENOUS at 13:15

## 2023-01-01 RX ADMIN — MIDODRINE HYDROCHLORIDE 2.5 MG: 2.5 TABLET ORAL at 16:08

## 2023-01-01 RX ADMIN — SODIUM CHLORIDE, PRESERVATIVE FREE 10 ML: 5 INJECTION INTRAVENOUS at 08:40

## 2023-01-01 RX ADMIN — FLUCONAZOLE 400 MG: 400 INJECTION, SOLUTION INTRAVENOUS at 10:10

## 2023-01-01 RX ADMIN — DILTIAZEM HYDROCHLORIDE 5 MG/HR: 5 INJECTION, SOLUTION INTRAVENOUS at 05:22

## 2023-01-01 RX ADMIN — FLUCONAZOLE 200 MG: 2 INJECTION, SOLUTION INTRAVENOUS at 10:04

## 2023-01-01 RX ADMIN — METOPROLOL TARTRATE 1.25 MG: 5 INJECTION INTRAVENOUS at 12:44

## 2023-01-01 RX ADMIN — SODIUM CHLORIDE: 9 INJECTION, SOLUTION INTRAVENOUS at 17:00

## 2023-01-01 RX ADMIN — SODIUM CHLORIDE, PRESERVATIVE FREE 10 ML: 5 INJECTION INTRAVENOUS at 21:06

## 2023-01-01 RX ADMIN — SODIUM CHLORIDE, PRESERVATIVE FREE 10 ML: 5 INJECTION INTRAVENOUS at 09:42

## 2023-01-01 RX ADMIN — SODIUM CHLORIDE, PRESERVATIVE FREE 10 ML: 5 INJECTION INTRAVENOUS at 08:27

## 2023-01-01 RX ADMIN — MIDODRINE HYDROCHLORIDE 5 MG: 2.5 TABLET ORAL at 11:45

## 2023-01-01 RX ADMIN — HEPARIN SODIUM 5000 UNITS: 5000 INJECTION INTRAVENOUS; SUBCUTANEOUS at 06:08

## 2023-01-01 RX ADMIN — CEFEPIME 1000 MG: 1 INJECTION, POWDER, FOR SOLUTION INTRAMUSCULAR; INTRAVENOUS at 20:47

## 2023-01-01 RX ADMIN — HEPARIN SODIUM 5000 UNITS: 5000 INJECTION INTRAVENOUS; SUBCUTANEOUS at 23:46

## 2023-01-01 RX ADMIN — SODIUM CHLORIDE: 9 INJECTION, SOLUTION INTRAVENOUS at 05:44

## 2023-01-01 RX ADMIN — CEFEPIME 1000 MG: 1 INJECTION, POWDER, FOR SOLUTION INTRAMUSCULAR; INTRAVENOUS at 22:27

## 2023-01-01 RX ADMIN — ACETAMINOPHEN 650 MG: 325 TABLET ORAL at 09:40

## 2023-01-01 RX ADMIN — DILTIAZEM HYDROCHLORIDE 2.5 MG/HR: 5 INJECTION, SOLUTION INTRAVENOUS at 23:16

## 2023-01-01 RX ADMIN — VANCOMYCIN HYDROCHLORIDE 1000 MG: 1 INJECTION, POWDER, LYOPHILIZED, FOR SOLUTION INTRAVENOUS at 15:34

## 2023-01-01 RX ADMIN — CEFEPIME 2000 MG: 2 INJECTION, POWDER, FOR SOLUTION INTRAVENOUS at 21:01

## 2023-01-01 RX ADMIN — METOPROLOL TARTRATE 1.25 MG: 5 INJECTION INTRAVENOUS at 08:48

## 2023-01-01 RX ADMIN — VANCOMYCIN HYDROCHLORIDE 750 MG: 750 INJECTION, POWDER, LYOPHILIZED, FOR SOLUTION INTRAVENOUS at 17:14

## 2023-01-01 RX ADMIN — SODIUM CHLORIDE, PRESERVATIVE FREE 10 ML: 5 INJECTION INTRAVENOUS at 21:58

## 2023-01-01 RX ADMIN — MIDODRINE HYDROCHLORIDE 5 MG: 2.5 TABLET ORAL at 16:56

## 2023-01-01 RX ADMIN — METOPROLOL TARTRATE 2.5 MG: 5 INJECTION INTRAVENOUS at 11:28

## 2023-01-01 RX ADMIN — MIDODRINE HYDROCHLORIDE 10 MG: 10 TABLET ORAL at 10:04

## 2023-01-01 RX ADMIN — QUETIAPINE FUMARATE 50 MG: 25 TABLET ORAL at 21:05

## 2023-01-01 RX ADMIN — CARBIDOPA AND LEVODOPA 1 TABLET: 25; 100 TABLET ORAL at 21:41

## 2023-01-01 RX ADMIN — SODIUM CHLORIDE, POTASSIUM CHLORIDE, SODIUM LACTATE AND CALCIUM CHLORIDE 1000 ML: 600; 310; 30; 20 INJECTION, SOLUTION INTRAVENOUS at 22:52

## 2023-01-01 RX ADMIN — ALBUMIN (HUMAN) 25 G: 0.25 INJECTION, SOLUTION INTRAVENOUS at 16:04

## 2023-01-01 RX ADMIN — HEPARIN SODIUM 5000 UNITS: 5000 INJECTION INTRAVENOUS; SUBCUTANEOUS at 13:47

## 2023-01-01 RX ADMIN — CARBIDOPA AND LEVODOPA 1 TABLET: 25; 100 TABLET ORAL at 07:55

## 2023-01-01 RX ADMIN — CARBIDOPA AND LEVODOPA 1 TABLET: 25; 100 TABLET ORAL at 21:05

## 2023-01-01 RX ADMIN — HEPARIN SODIUM 5000 UNITS: 5000 INJECTION INTRAVENOUS; SUBCUTANEOUS at 21:49

## 2023-01-01 RX ADMIN — ALBUMIN (HUMAN) 25 G: 0.25 INJECTION, SOLUTION INTRAVENOUS at 04:01

## 2023-01-01 RX ADMIN — SODIUM CHLORIDE, PRESERVATIVE FREE 10 ML: 5 INJECTION INTRAVENOUS at 20:57

## 2023-01-01 RX ADMIN — MIDODRINE HYDROCHLORIDE 10 MG: 10 TABLET ORAL at 08:48

## 2023-01-01 RX ADMIN — CARBIDOPA AND LEVODOPA 1 TABLET: 25; 100 TABLET ORAL at 09:40

## 2023-01-01 RX ADMIN — ALBUMIN (HUMAN) 25 G: 0.25 INJECTION, SOLUTION INTRAVENOUS at 04:34

## 2023-01-01 RX ADMIN — ATORVASTATIN CALCIUM 80 MG: 20 TABLET, FILM COATED ORAL at 21:05

## 2023-01-01 RX ADMIN — SODIUM CHLORIDE, PRESERVATIVE FREE 10 ML: 5 INJECTION INTRAVENOUS at 23:49

## 2023-01-01 RX ADMIN — MIDODRINE HYDROCHLORIDE 5 MG: 2.5 TABLET ORAL at 12:30

## 2023-01-01 RX ADMIN — CEFEPIME 1000 MG: 1 INJECTION, POWDER, FOR SOLUTION INTRAMUSCULAR; INTRAVENOUS at 20:34

## 2023-01-01 RX ADMIN — POTASSIUM BICARBONATE 40 MEQ: 782 TABLET, EFFERVESCENT ORAL at 05:53

## 2023-01-01 RX ADMIN — MIDODRINE HYDROCHLORIDE 5 MG: 2.5 TABLET ORAL at 16:04

## 2023-01-01 RX ADMIN — SODIUM CHLORIDE, PRESERVATIVE FREE 10 ML: 5 INJECTION INTRAVENOUS at 08:25

## 2023-01-01 RX ADMIN — CARBIDOPA AND LEVODOPA 1 TABLET: 25; 100 TABLET ORAL at 08:30

## 2023-01-01 RX ADMIN — SODIUM CHLORIDE, PRESERVATIVE FREE 10 ML: 5 INJECTION INTRAVENOUS at 10:05

## 2023-01-01 RX ADMIN — PIPERACILLIN AND TAZOBACTAM 3375 MG: 3; .375 INJECTION, POWDER, LYOPHILIZED, FOR SOLUTION INTRAVENOUS at 12:47

## 2023-01-01 RX ADMIN — SODIUM CHLORIDE: 9 INJECTION, SOLUTION INTRAVENOUS at 20:30

## 2023-01-01 RX ADMIN — HEPARIN SODIUM 5000 UNITS: 5000 INJECTION INTRAVENOUS; SUBCUTANEOUS at 05:31

## 2023-01-01 RX ADMIN — SODIUM CHLORIDE, PRESERVATIVE FREE 10 ML: 5 INJECTION INTRAVENOUS at 08:49

## 2023-01-01 RX ADMIN — HEPARIN SODIUM 5000 UNITS: 5000 INJECTION INTRAVENOUS; SUBCUTANEOUS at 14:14

## 2023-01-01 RX ADMIN — SODIUM CHLORIDE 100 MG: 9 INJECTION, SOLUTION INTRAVENOUS at 05:28

## 2023-01-01 RX ADMIN — ATORVASTATIN CALCIUM 80 MG: 20 TABLET, FILM COATED ORAL at 20:19

## 2023-01-01 RX ADMIN — POTASSIUM BICARBONATE 40 MEQ: 782 TABLET, EFFERVESCENT ORAL at 08:18

## 2023-01-01 RX ADMIN — ALBUMIN (HUMAN) 25 G: 0.25 INJECTION, SOLUTION INTRAVENOUS at 04:40

## 2023-01-01 RX ADMIN — CARBIDOPA AND LEVODOPA 1 TABLET: 25; 100 TABLET ORAL at 08:48

## 2023-01-01 RX ADMIN — MIDODRINE HYDROCHLORIDE 2.5 MG: 2.5 TABLET ORAL at 11:28

## 2023-01-01 RX ADMIN — SODIUM CHLORIDE, PRESERVATIVE FREE 10 ML: 5 INJECTION INTRAVENOUS at 07:54

## 2023-01-01 RX ADMIN — DEXTROSE MONOHYDRATE: 50 INJECTION, SOLUTION INTRAVENOUS at 09:40

## 2023-01-01 RX ADMIN — MIDODRINE HYDROCHLORIDE 10 MG: 10 TABLET ORAL at 17:20

## 2023-01-01 RX ADMIN — SODIUM CHLORIDE 100 MG: 9 INJECTION, SOLUTION INTRAVENOUS at 05:52

## 2023-01-01 RX ADMIN — CEFEPIME 1000 MG: 1 INJECTION, POWDER, FOR SOLUTION INTRAMUSCULAR; INTRAVENOUS at 21:40

## 2023-01-01 RX ADMIN — CEFTAROLINE FOSAMIL 200 MG: 600 POWDER, FOR SOLUTION INTRAVENOUS at 12:46

## 2023-01-01 RX ADMIN — SODIUM CHLORIDE, PRESERVATIVE FREE 10 ML: 5 INJECTION INTRAVENOUS at 23:00

## 2023-01-01 RX ADMIN — ALBUMIN (HUMAN) 25 G: 0.25 INJECTION, SOLUTION INTRAVENOUS at 21:04

## 2023-01-01 RX ADMIN — HEPARIN SODIUM 5000 UNITS: 5000 INJECTION INTRAVENOUS; SUBCUTANEOUS at 05:52

## 2023-01-01 RX ADMIN — SODIUM CHLORIDE, PRESERVATIVE FREE 10 ML: 5 INJECTION INTRAVENOUS at 20:20

## 2023-01-01 RX ADMIN — ALBUMIN (HUMAN) 25 G: 0.25 INJECTION, SOLUTION INTRAVENOUS at 15:24

## 2023-01-01 RX ADMIN — SODIUM CHLORIDE, PRESERVATIVE FREE 40 MG: 5 INJECTION INTRAVENOUS at 08:18

## 2023-01-01 RX ADMIN — MIDODRINE HYDROCHLORIDE 5 MG: 2.5 TABLET ORAL at 09:40

## 2023-01-01 RX ADMIN — ATORVASTATIN CALCIUM 80 MG: 20 TABLET, FILM COATED ORAL at 20:47

## 2023-01-01 RX ADMIN — SODIUM CHLORIDE, POTASSIUM CHLORIDE, SODIUM LACTATE AND CALCIUM CHLORIDE 1000 ML: 600; 310; 30; 20 INJECTION, SOLUTION INTRAVENOUS at 12:02

## 2023-01-01 RX ADMIN — ALBUMIN (HUMAN) 25 G: 0.25 INJECTION, SOLUTION INTRAVENOUS at 21:40

## 2023-01-01 RX ADMIN — MIDODRINE HYDROCHLORIDE 2.5 MG: 2.5 TABLET ORAL at 13:36

## 2023-01-01 RX ADMIN — FLUCONAZOLE 200 MG: 2 INJECTION, SOLUTION INTRAVENOUS at 08:48

## 2023-01-01 RX ADMIN — ALBUMIN (HUMAN) 25 G: 0.25 INJECTION, SOLUTION INTRAVENOUS at 09:42

## 2023-01-01 RX ADMIN — SODIUM CHLORIDE, PRESERVATIVE FREE 40 MG: 5 INJECTION INTRAVENOUS at 10:03

## 2023-01-01 RX ADMIN — CEFTAROLINE FOSAMIL 200 MG: 600 POWDER, FOR SOLUTION INTRAVENOUS at 02:18

## 2023-01-01 RX ADMIN — CARBIDOPA AND LEVODOPA 1 TABLET: 25; 100 TABLET ORAL at 08:40

## 2023-01-01 RX ADMIN — Medication 2 MCG/MIN: at 20:33

## 2023-01-01 RX ADMIN — SODIUM CHLORIDE, PRESERVATIVE FREE 40 MG: 5 INJECTION INTRAVENOUS at 20:57

## 2023-01-01 RX ADMIN — ALBUMIN (HUMAN) 25 G: 0.25 INJECTION, SOLUTION INTRAVENOUS at 16:06

## 2023-01-01 RX ADMIN — MORPHINE SULFATE 5 MG: 100 SOLUTION ORAL at 10:24

## 2023-01-01 RX ADMIN — LORAZEPAM 0.5 MG: 2 SOLUTION, CONCENTRATE ORAL at 13:27

## 2023-01-01 RX ADMIN — ALBUMIN (HUMAN) 25 G: 0.25 INJECTION, SOLUTION INTRAVENOUS at 21:55

## 2023-01-01 RX ADMIN — CEFEPIME 1000 MG: 1 INJECTION, POWDER, FOR SOLUTION INTRAMUSCULAR; INTRAVENOUS at 00:30

## 2023-01-01 RX ADMIN — HEPARIN SODIUM 5000 UNITS: 5000 INJECTION INTRAVENOUS; SUBCUTANEOUS at 14:20

## 2023-01-01 RX ADMIN — HEPARIN SODIUM 5000 UNITS: 5000 INJECTION INTRAVENOUS; SUBCUTANEOUS at 21:52

## 2023-01-01 RX ADMIN — FUROSEMIDE 20 MG: 10 INJECTION, SOLUTION INTRAMUSCULAR; INTRAVENOUS at 00:14

## 2023-01-01 RX ADMIN — CARBIDOPA AND LEVODOPA 1 TABLET: 25; 100 TABLET ORAL at 23:45

## 2023-01-01 RX ADMIN — ALBUMIN (HUMAN) 25 G: 0.25 INJECTION, SOLUTION INTRAVENOUS at 17:13

## 2023-01-01 RX ADMIN — ALBUMIN (HUMAN) 25 G: 0.25 INJECTION, SOLUTION INTRAVENOUS at 04:33

## 2023-01-01 RX ADMIN — SODIUM CHLORIDE, PRESERVATIVE FREE 40 MG: 5 INJECTION INTRAVENOUS at 08:26

## 2023-01-01 RX ADMIN — METOPROLOL TARTRATE 1.25 MG: 5 INJECTION INTRAVENOUS at 21:06

## 2023-01-01 RX ADMIN — ALBUMIN (HUMAN) 25 G: 0.25 INJECTION, SOLUTION INTRAVENOUS at 10:10

## 2023-01-01 RX ADMIN — HEPARIN SODIUM 5000 UNITS: 5000 INJECTION INTRAVENOUS; SUBCUTANEOUS at 21:58

## 2023-01-01 RX ADMIN — CARBIDOPA AND LEVODOPA 1 TABLET: 25; 100 TABLET ORAL at 20:34

## 2023-01-01 RX ADMIN — HEPARIN SODIUM 5000 UNITS: 5000 INJECTION INTRAVENOUS; SUBCUTANEOUS at 15:03

## 2023-01-01 RX ADMIN — ATORVASTATIN CALCIUM 80 MG: 20 TABLET, FILM COATED ORAL at 23:45

## 2023-01-01 RX ADMIN — SODIUM CHLORIDE, PRESERVATIVE FREE 10 ML: 5 INJECTION INTRAVENOUS at 20:34

## 2023-01-01 RX ADMIN — ALBUMIN (HUMAN) 25 G: 0.25 INJECTION, SOLUTION INTRAVENOUS at 10:04

## 2023-01-01 RX ADMIN — SODIUM CHLORIDE, PRESERVATIVE FREE 40 MG: 5 INJECTION INTRAVENOUS at 07:53

## 2023-01-01 RX ADMIN — MIDODRINE HYDROCHLORIDE 5 MG: 2.5 TABLET ORAL at 07:53

## 2023-01-01 RX ADMIN — METOPROLOL TARTRATE 1.25 MG: 5 INJECTION INTRAVENOUS at 10:04

## 2023-01-01 RX ADMIN — ALBUMIN (HUMAN) 25 G: 0.25 INJECTION, SOLUTION INTRAVENOUS at 22:51

## 2023-01-01 RX ADMIN — MIDODRINE HYDROCHLORIDE 2.5 MG: 2.5 TABLET ORAL at 08:40

## 2023-01-01 RX ADMIN — CARBIDOPA AND LEVODOPA 1 TABLET: 25; 100 TABLET ORAL at 08:52

## 2023-01-01 RX ADMIN — ALBUMIN (HUMAN) 25 G: 0.25 INJECTION, SOLUTION INTRAVENOUS at 10:48

## 2023-01-01 RX ADMIN — SODIUM CHLORIDE, PRESERVATIVE FREE 40 MG: 5 INJECTION INTRAVENOUS at 09:41

## 2023-01-01 RX ADMIN — HEPARIN SODIUM 5000 UNITS: 5000 INJECTION INTRAVENOUS; SUBCUTANEOUS at 14:16

## 2023-01-01 RX ADMIN — PERFLUTREN 1.5 ML: 6.52 INJECTION, SUSPENSION INTRAVENOUS at 10:23

## 2023-01-01 RX ADMIN — SODIUM CHLORIDE: 9 INJECTION, SOLUTION INTRAVENOUS at 05:35

## 2023-01-01 RX ADMIN — METOPROLOL TARTRATE 1.25 MG: 5 INJECTION INTRAVENOUS at 14:15

## 2023-01-01 RX ADMIN — ALBUMIN (HUMAN) 25 G: 0.25 INJECTION, SOLUTION INTRAVENOUS at 16:35

## 2023-01-01 RX ADMIN — ALBUMIN (HUMAN) 25 G: 0.25 INJECTION, SOLUTION INTRAVENOUS at 04:19

## 2023-01-01 RX ADMIN — HEPARIN SODIUM 5000 UNITS: 5000 INJECTION INTRAVENOUS; SUBCUTANEOUS at 05:36

## 2023-01-01 RX ADMIN — CARBIDOPA AND LEVODOPA 1 TABLET: 25; 100 TABLET ORAL at 20:47

## 2023-01-01 RX ADMIN — SODIUM CHLORIDE, PRESERVATIVE FREE 40 MG: 5 INJECTION INTRAVENOUS at 08:40

## 2023-01-01 RX ADMIN — ALBUMIN (HUMAN) 25 G: 0.25 INJECTION, SOLUTION INTRAVENOUS at 23:44

## 2023-01-01 RX ADMIN — ALBUMIN (HUMAN) 25 G: 0.25 INJECTION, SOLUTION INTRAVENOUS at 21:52

## 2023-01-01 RX ADMIN — ALBUMIN (HUMAN) 25 G: 0.25 INJECTION, SOLUTION INTRAVENOUS at 10:23

## 2023-01-01 RX ADMIN — MIDODRINE HYDROCHLORIDE 10 MG: 10 TABLET ORAL at 17:13

## 2023-01-01 ASSESSMENT — PAIN SCALES - GENERAL
PAINLEVEL_OUTOF10: 0

## 2023-01-01 ASSESSMENT — PAIN SCALES - WONG BAKER
WONGBAKER_NUMERICALRESPONSE: 0
WONGBAKER_NUMERICALRESPONSE: 0

## 2023-01-01 ASSESSMENT — PAIN DESCRIPTION - RADICULAR PAIN: RADICULAR_PAIN: ABSENT

## 2023-01-01 ASSESSMENT — PAIN - FUNCTIONAL ASSESSMENT: PAIN_FUNCTIONAL_ASSESSMENT: ADULT NONVERBAL PAIN SCALE (NPVS)

## 2023-02-13 RX ORDER — DOXYCYCLINE HYCLATE 100 MG
100 TABLET ORAL 2 TIMES DAILY
Qty: 14 TABLET | Refills: 0 | Status: SHIPPED | OUTPATIENT
Start: 2023-02-13 | End: 2023-02-20

## 2023-03-08 PROBLEM — R33.9 URINARY RETENTION: Status: ACTIVE | Noted: 2023-01-01

## 2023-06-13 PROBLEM — N39.0 UTI (URINARY TRACT INFECTION): Status: ACTIVE | Noted: 2023-01-01

## 2023-06-13 PROBLEM — G31.84 MCI (MILD COGNITIVE IMPAIRMENT): Status: ACTIVE | Noted: 2018-04-13

## 2023-06-13 PROBLEM — A41.9 SEPSIS (HCC): Status: ACTIVE | Noted: 2023-01-01

## 2023-06-13 PROBLEM — I48.91 ATRIAL FIBRILLATION WITH RAPID VENTRICULAR RESPONSE (HCC): Status: ACTIVE | Noted: 2019-07-24

## 2023-06-13 NOTE — ED PROVIDER NOTES
[facility] ENCOUNTER    Patient Name: Elo Zapata  MRN: [id number]  YOB: 1940  Provider: Zion Lester DO  PCP: Willard Henriquez MD   Time/Date of evaluation: 9:09 AM EDT on 6/13/23    History of Presenting Illness     Chief Complaint   Patient presents with    Altered Mental Status    Urinary Retention    Abnormal Lab     Creatinine levels elevated as well as Bun last collected 6/12/2023 from [facility]       History Provided [facility] and Chart Review     History (Narative):   Elo Zapata is a 79 y.o. male who presents to the emergency department for concern from his nursing care home for decreased activity and worsening kidney function. Patient has paperwork with him that says DNR. Patient provides his name to me, correct year and month, but does not know location. He follows basic commands. He denies being in any pain. He says my hands are cold. Patient arrives with blackwood. Nursing Notes were all reviewed and agreed with or any disagreements were addressed in the HPI. Past History     Past Medical History:  Past Medical History:   Diagnosis Date    Atrial fibrillation with rapid ventricular response ([address]) 7/24/2019    DVT (deep venous thrombosis) ([address]) 2/12/2015    LEFT LEG    Enlarged prostate with lower urinary tract symptoms (LUTS) 4/9/2019    Gross hematuria 8/16/2019    Last Assessment & Plan:  ? Blackwood is in place ? No hematuria    Heart attack ([address])     Weak urinary stream 04/10/2019       Past Surgical History:  No past surgical history on file. Family History:  No family history on file.     Social History:  Social History     Tobacco Use    Smoking status: Former    Smokeless tobacco: Never   Substance Use Topics    Alcohol use: Never       Medications:  Current Facility-Administered Medications   Medication Dose Route Frequency Provider Last Rate Last Admin    lactated ringers bolus

## 2023-06-13 NOTE — H&P
CONTRAST    Result Date: 6/13/2023  No acute abnormality. XR CHEST PORTABLE    Result Date: 6/13/2023  Patchy right-sided airspace disease and associated pleural effusion. Follow-up to complete radiographic resolution recommended. US RETROPERITONEAL COMPLETE    Result Date: 6/13/2023  1. 9 x 4.4 x 9.5 cm posterior mass in the urinary bladder. 2. Echogenic debris within the urinary bladder lumen. 3. Mild bilateral renal pelvocaliectasis with increased echotexture suggesting hemorrhagic or proteinaceous material. 4. Medical renal disease with increased renal echotexture bilaterally. XR CHEST PORTABLE   Final Result   No pneumothorax following insertion of internal jugular catheter. CT ABDOMEN PELVIS WO CONTRAST Additional Contrast? None   Final Result      1. Mild bilateral renal pelvocaliectasis and ureteral dilation, moderate urinary   bladder distention and prostatic urethral distention as above. Anterior   air-fluid level in urinary bladder. Ultrasound as demonstrated posterior mass of   the urinary bladder is not demonstrated on these images are  from   adjacent urinary bladder fluid. 2. Moderate right and small left pleural effusions. CT HEAD WO CONTRAST   Final Result   No acute abnormality. US RETROPERITONEAL COMPLETE   Final Result      1. 9 x 4.4 x 9.5 cm posterior mass in the urinary bladder. 2. Echogenic debris within the urinary bladder lumen. 3. Mild bilateral renal pelvocaliectasis with increased echotexture suggesting   hemorrhagic or proteinaceous material.   4. Medical renal disease with increased renal echotexture bilaterally. XR CHEST PORTABLE   Final Result   Patchy right-sided airspace disease and associated pleural effusion. Follow-up   to complete radiographic resolution recommended.           LAST EKG  Results for orders placed or performed during the hospital encounter of 06/13/23   EKG 12 Lead   Result Value Ref Range    Ventricular Rate 118

## 2023-06-13 NOTE — ED TRIAGE NOTES
Pt. Arrived via EMS from [facility] for low urine output. Facility states he has shown decline in alertness over the course of the past week. Labs from the facility this morning 6/12/2023 show elevated creatinine and BUN levels.  Pt does have blackwood in place from facility prior to arrival.

## 2023-06-14 PROBLEM — R77.8 TROPONIN LEVEL ELEVATED: Status: ACTIVE | Noted: 2023-01-01

## 2023-06-14 PROBLEM — Z78.9 POOR PROGNOSIS: Status: ACTIVE | Noted: 2023-01-01

## 2023-06-14 PROBLEM — Z51.5 PALLIATIVE CARE ENCOUNTER: Status: ACTIVE | Noted: 2023-01-01

## 2023-06-14 PROBLEM — Z71.89 DNR (DO NOT RESUSCITATE) DISCUSSION: Status: ACTIVE | Noted: 2023-01-01

## 2023-06-14 PROBLEM — Z71.89 GOALS OF CARE, COUNSELING/DISCUSSION: Status: ACTIVE | Noted: 2023-01-01

## 2023-06-14 PROBLEM — N39.41 URGE INCONTINENCE: Status: ACTIVE | Noted: 2019-04-10

## 2023-06-14 PROBLEM — I95.9 HYPOTENSION: Status: ACTIVE | Noted: 2023-01-01

## 2023-06-14 NOTE — PROGRESS NOTES
No urgent need for RRT  IVF  Abx  Verduzco, I/os  Urology following    Yessi Ball MD, MD  VIA [facility]  [phone]

## 2023-06-14 NOTE — PROGRESS NOTES
Spoke to nephrology post obstructive uropathy gradual imporvment , bladder mass, Dnr no plans for hemodialysis  ADRIA Arellano MD

## 2023-06-14 NOTE — PLAN OF CARE
Problem: Discharge Planning  Goal: Discharge to home or other facility with appropriate resources  Outcome: Not Progressing     Problem: Safety - Adult  Goal: Free from fall injury  Outcome: Not Progressing     Problem: Pain  Goal: Verbalizes/displays adequate comfort level or baseline comfort level  Outcome: Not Progressing     Problem: Safety - Medical Restraint  Goal: Remains free of injury from restraints (Restraint for Interference with Medical Device)  Description: INTERVENTIONS:  1. Determine that other, less restrictive measures have been tried or would not be effective before applying the restraint  2. Evaluate the patient's condition at the time of restraint application  3. Inform patient/family regarding the reason for restraint  4.  Q2H: Monitor safety, psychosocial status, comfort, nutrition and hydration  Outcome: Not Progressing

## 2023-06-14 NOTE — PROGRESS NOTES
0700- Lab called with troponin of 3,007,  paged. [phone] [facility] paged. [phone]-  notified of troponin.

## 2023-06-14 NOTE — PROGRESS NOTES
Speech-Language Pathology Dysphagia Evaluation Attempt  1330: Orders received and chart reviewed. Attempted to see patient for clinical bedside swallow evaluation. Per RN Brianna Osullivan, patient eating very little and does not care for the food here. She also reports that he will often pretend to be asleep to avoid engaging. Patient asleep or feigning sleep upon ST arrival and did not respond/attend to multiple verbal/tactile stim attempts to arouse. Lunch tray at bedside, currently on regular diet. ST cut solids into small bite size pieces. Again, patient not rousing/responding to attempts to engage. Per RN patient missing most teeth. At this time, recommend aspiration precautions and minced and moist diet d/t dental status and probability that patient is unable to cut food on his own. D/w at length with KENRICK Osullivan. ST will follow up tomorrow for full evaluation.    Thank you for this referral.   Blanca Alejandro M.S., CCC-SLP

## 2023-06-14 NOTE — PROGRESS NOTES
1930:Pt arrived to ICU, assessment completed per flow sheet. 2000: Rectal temperature 94.3 warming blanket applied. 2130: Informed Dr. Anitha Rivera of hypothermia and of pt in Afib RVR in the 120-130s. MD to place orders. 0- Walked in room pt had hands in the air with central line dressing in hand, soft bilateral restraints applied to protect integrity of lines/tubes, CVL appears to be in same position, notified Anitha Rivera. 2242- Cardizem ordered per Anitha Rivera MD made aware that levophed is currently infusing. 2248: Asked Anitha Rivera about LR bolus from ED. Orders received to administer    2250:5mL output from blackwood. Bladder scan completed= 320 ml. Diann Winslow with 20 mL, 390 ml of urine received. 0000: Reassessment completed and without change. Rectal temp 99.7 shae hugger stopped    0005: Run of v-tach noted, self limiting, opens eyes and talks, NAD,  updated. Orders received to do AM labs     0102: No urine output, bladder scan= 320, irrigated, 330 ml of urine received. 0106: Rectal temp 95.7 shae hugger restarted. 0300: No urine output, irrigated, 290 ml of urine received. MD notified and consulted Urology     1254: Converted to sinus rhythm. HR in 70s    0400: Reassessment completed, rectal temp 100.4 Shae hugger removed.

## 2023-06-14 NOTE — PROGRESS NOTES
Cefepime ( Maxipime ) Extended Infusion    Doreen Silver, a 79 y.o. yo male, has been converted to an extended infusion of Cefepime while in the intensive care unit. Indication: Urinary tract infection x 7 days     A loading dose of  Cefepime 2000 mg IVPB x 1 will be ordered over 30 minutes followed by   Cefepime 1000 mg IVPB q24h over 240 minutes to start 24 hours after loading dose. No results for input(s): CREA in the last 72 hours.   Ht Readings from Last 1 Encounters:   06/13/23 5' 8\" (1.727 m)     Wt Readings from Last 1 Encounters:   06/13/23 156 lb 8.4 oz (71 kg)     CrCl : Serum creatinine: 12.5 mg/dL (H) 06/13/23 0849  Estimated creatinine clearance: 4 mL/min (A)    Joe Kimble RP, BCPS

## 2023-06-14 NOTE — PROGRESS NOTES
Pt with coude blackwood in place. He ideally needs CBI but will avoid removing the blackwood tonight to place a 3 way catheter as urology may need to replace it. Will place consult for morning.

## 2023-06-15 NOTE — PROGRESS NOTES
1930- Report and care received, assessment completed per flow sheet. Lethargic, opens eyes to verbal, confused, calm. 0000- Reassessment without change except urine appears to be less bloody, now is a yellow with pink tinged color. 0300- Reassessment without change except urine appears clearer/with less blood.

## 2023-06-15 NOTE — PROGRESS NOTES
with water. Patient will benefit from skilled intervention to address the above impairments. Patient's rehabilitation potential/ .  Factors which may influence rehabilitation potential include:   []            None noted  [x]            Mental ability/status  []            Medical condition  []            Home/family situation and support systems  [x]            Safety awareness  []            Pain tolerance/management  []            Other:      PLAN :  Recommendations and Planned Interventions:  Recommendations  Requires SLP Intervention: Yes  Recommendations: Assistance with meals; Supervision with meals  D/C Recommendations: To be determined  Diet Solids Recommendation: Minced & Moist  Liquid Consistency Recommendation: Thin  Compensatory Swallowing Strategies : Alternate solids and liquids;Small bites/sips; Remain upright for 30-45 minutes after meals;Upright as possible for all oral intake; Check for pocketing of food on the Left; Check for pocketing of food on the Right;Assist feed;Eat/Feed slowly  Recommended Form of Meds: Other  Therapeutic Interventions: Diet tolerance monitoring  Patient Education Response: No evidence of learning  Frequency/Duration:   Dysphagia: Patient will be followed by speech-language pathology 1-2 times per day/3-5 days per week to address goals. Speech/Language: Patient will be followed by speech-language pathology 1-2 times per day/3-5 days per week to address goals. SUBJECTIVE:   Patient stated, Beba Bermudez. OBJECTIVE:     Past Medical History:   Diagnosis Date    Atrial fibrillation with rapid ventricular response ([address]) 7/24/2019    DVT (deep venous thrombosis) ([address]) 2/12/2015    LEFT LEG    Enlarged prostate with lower urinary tract symptoms (LUTS) 4/9/2019    Gross hematuria 8/16/2019    Last Assessment & Plan:  ? Verduzco is in place ? No hematuria    Heart attack ([address])     Weak urinary stream 04/10/2019   No past surgical history on file.   Prior Level of Function/Home

## 2023-06-15 NOTE — CONSULTS
[address] Floor: [phone] ([phone])  [facility]: [phone]     Patient Name: Ford Rogel  YOB: 1940    Date of Initial Consult: 6/14/2023  Reason for Consult: goals of care discussion/symptoms management  Requesting Provider:  Antonella Lagos MD  Primary Care Physician: Hanna Hill MD      SUMMARY:     Ford Rogel is a 79 y.o. with a past history of A-fib, coronary artery disease, DVT, BPH s/p Verduzco, who was admitted on 6/13/2023 from long-term care nursing facility with a diagnosis of sepsis, UTI and acute on chronic kidney disease. Patient initially presented from nursing home for further evaluation of increasing lethargy and confusion with generalized weakness. Patient was on diuretics reportedly and was found to have worsening renal function. In the emergency room, patient had minimal urinary output and CT scan was done showed air-fluid level in the bladder. Urology and nephrology services were consulted. Patient had ultrasound done showed posterior mass and urinary bladder. Patient was also noted to have hypotension was started on IV pressor and admitted to ICU. He was also started on IV antibiotic for sepsis management. Due to intermittent agitation, patient was started on soft restraints. Current medical issues leading to Palliative Medicine involvement include: To establish goals of care. 6/14/2023: Patient was seen in presence of palliative care RN. Patient is very lethargic and sleepy currently. Patient has bilateral soft wrist restraints as he was trying to get out of the bed and pulling his Verduzco catheter per nursing. He remains in levo and IV antibiotics currently. Patient tries to open his eyes on tactile commands only at this point.      HISTORY:     History obtained from: Medical records    CHIEF COMPLAINT: Generalized weakness and increased lethargy    HPI/SUBJECTIVE:    The patient is:   [] Verbal
Cardiolology  Inpatient Consult      Patient: Cristiana Madera               Sex: male          DOA: 6/13/2023       YOB: 1940      Age:  79 y.o.        LOS:  LOS: 1 day      Cristiana Madera is a 79 y.o. male admitted for Shortness of breath [R06.02]  UTI (urinary tract infection) [N39.0]  Atrial fibrillation with rapid ventricular response ([address]) [I48.91]    Recommendations:  Continue diltiazem  Cardiology to follow  No invasive evaluation is planned in patient with sepsis and DNR status  Guideline directed medical therapy    Impression:  Atrial fibrillation with rapid VR  Suspect CAD based on echo findings  Elevated troponin, type 2 MI  No evidence of acute coronary syndrome  Other problems as enumerated below    Patient Active Problem List    Diagnosis Date Noted    Chronic indwelling Verduzco catheter 11/18/2019    Enlarged prostate with lower urinary tract symptoms (LUTS) 04/09/2019    Urinary retention 03/08/2023    Urge incontinence 04/10/2019    Weak urinary stream 04/10/2019    Nocturia 04/10/2019    Urinary frequency 04/09/2019    UTI (urinary tract infection) 06/13/2023    Sepsis ([address]) 06/13/2023    Infected sebaceous cyst of skin 11/12/2020    History of ST elevation myocardial infarction (STEMI) 07/24/2019    Atrial fibrillation with rapid ventricular response ([address]) 07/24/2019    Genital herpes 04/10/2019    Chest pain 04/10/2019    Shortness of breath 04/10/2019    MCI (mild cognitive impairment) 04/13/2018    History of complex partial epilepsy 03/09/2017    Idiopathic peripheral neuropathy 03/09/2017    DVT (deep venous thrombosis) ([address]) 02/12/2015    GERD (gastroesophageal reflux disease) 09/11/2009    Essential hypertension 09/11/2009    Venous (peripheral) insufficiency 09/11/2009      Hillary Sams MD  Past Medical History:   Diagnosis Date    Atrial fibrillation with rapid ventricular response ([address]) 7/24/2019    DVT (deep venous thrombosis) ([address]) 2/12/2015    LEFT LEG    Enlarged
Consult Note  Consult requested by:Dr Cathryn White is a 79 y.o. male White (non-) who is being seen on consult for renal failure. Chief Complaint   Patient presents with    Altered Mental Status    Urinary Retention    Abnormal Lab     Creatinine levels elevated as well as Bun last collected 6/12/2023 from [facility]     Admission diagnosis: MICHAEL/urinary retention    HPI:  80 yo PMH BPH. CAD, urinary retention + indwelling blackwood presents with AMS,decreased UOP, elevated Cr 12.5, K 4.5, CO2 20. Baseline Cr 1.1 on 5/23/23. C/p abd pain, decreased urination  Renal ultrasound with bilateral hydronephrosis  Just had CT  Past Medical History:   Diagnosis Date    Atrial fibrillation with rapid ventricular response ([address]) 7/24/2019    DVT (deep venous thrombosis) ([address]) 2/12/2015    LEFT LEG    Enlarged prostate with lower urinary tract symptoms (LUTS) 4/9/2019    Gross hematuria 8/16/2019    Last Assessment & Plan:  ? Blackwood is in place ? No hematuria    Heart attack ([address])     Weak urinary stream 04/10/2019     No past surgical history on file. Social History     Socioeconomic History    Marital status:       Spouse name: Not on file    Number of children: Not on file    Years of education: Not on file    Highest education level: Not on file   Occupational History    Not on file   Tobacco Use    Smoking status: Former    Smokeless tobacco: Never   Substance and Sexual Activity    Alcohol use: Never    Drug use: Not on file    Sexual activity: Not on file   Other Topics Concern    Not on file   Social History Narrative    Not on file     Social Determinants of Health     Financial Resource Strain: Not on file   Food Insecurity: Not on file   Transportation Needs: Not on file   Physical Activity: Not on file   Stress: Not on file   Social Connections: Not on file   Intimate Partner Violence: Not on file   Housing Stability: Not on file   Lives at [address]
[facility]  Pulmonary, Critical Care, and Sleep Medicine    Name: Eliud Jordan MRN: [id number]   : 1940 Hospital: [facility]    Date: 6/15/2023  Room: [address] [place] [address] Note                                              Consult requesting physician: Dr. Allie Deutsch    Reason for Consult: septic shock, acute renal failure. Elevated troponin, change in mental sttaus       IMPRESSION:   Septic shock  Acute renal failure  Atrial fibrillation with RVR  Hypertension  Urosepsis  History of coronary artery disease  Change in mental status     Patient Active Problem List   Diagnosis    GERD (gastroesophageal reflux disease)    Essential hypertension    Urge incontinence    Genital herpes    DVT (deep venous thrombosis) ([facility])    Venous (peripheral) insufficiency    History of ST elevation myocardial infarction (STEMI)    Urinary frequency    Weak urinary stream    MCI (mild cognitive impairment)    Enlarged prostate with lower urinary tract symptoms (LUTS)    History of complex partial epilepsy    Infected sebaceous cyst of skin    Idiopathic peripheral neuropathy    Chronic indwelling Verduzco catheter    Chest pain    Shortness of breath    Atrial fibrillation with rapid ventricular response ([facility])    Nocturia    Urinary retention    UTI (urinary tract infection)    Sepsis ([facility])    Hypotension    Troponin level elevated    Palliative care encounter    Goals of care, counseling/discussion    DNR (do not resuscitate) discussion    Poor prognosis           Code status: DNR/DNI      RECOMMENDATIONS:   Respiratory:   Mild tachypnea stable respiratory status. Atrial fibrillation with RVR congestive heart failure no pulmonary history. Mild bilateral pleural effusions related to low albumin's. And cardiomyopathy. NC to keep sat >92%  Patient is DNR monitor for fluid overload. Keep SPO2 >=92%. HOB 30 degree elevation all the time. Aggressive pulmonary toileting.  Aspiration
[facility]  (A Division of [facility])                                                           Date of Admission: 6/13/2023   Date of Note: 6/15/2023  Reason for Consult:  Candidemia  Referring MD: Dr Marianela Evans    Thank you for involving me in the care of this patient. Please do not hesitate to contact me on the above number if question or concern. Current Antimicrobials:    Prior Antimicrobials:  Vancomycin 6/13 to date  Cefepime 6/13 to date   Zosyn X1   Allergy to antibiotics: None     Assessment--ID related:     Critically Ill patient with:    Septic shock  with organ injury-- leucocytosis/ renal failure/ elevated cardiac enzymes  Candidemia-- suspect urinary source, with indwelling blackwood    Large bladder mass on CT that was not present in Feb 2023- per Urology note. Inflammatory debri/ fungal ball? 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--final ID pending  --UA with TNTC wbc, yeast and bacteria + , urine culture - strep/ gnr -ID pending    MICHAEL -cr 12 on admission: improving( cr was normal 05/2023)    Active Hospital Problems    Diagnosis     Urge incontinence [N39.41]      Priority: Medium    Hypotension [I95.9]     Troponin level elevated [R77.8]     Palliative care encounter [Z51.5]     Goals of care, counseling/discussion [Z71.89]     DNR (do not resuscitate) discussion [Z71.89]     Poor prognosis [Z78.9]     UTI (urinary tract infection) [N39.0]     Sepsis ([address]) [A41.9]     DVT (deep venous thrombosis) ([address]) [I82.409]           Recommendation -- ID related:     FU blood culture until final, urine culture until final from 6/13  Repeat blood culture today  Agree with Eraxis for now-- will switch to Fluconazole with better penetration to urine soon  Urology following- patient has  bladder mass  DC vancomycin.  Cefepime IV- dosed for CrCl for bacterial coverage still
8/16/2019    Last Assessment & Plan:  ? Verduzco is in place ? No hematuria    Heart attack ([address])     Weak urinary stream 04/10/2019      No past surgical history on file. No family history on file. Social History     Tobacco Use    Smoking status: Former    Smokeless tobacco: Never   Substance Use Topics    Alcohol use: Never     Allergies   Allergen Reactions    Clindamycin Hives     Generalized red body rash    Codeine Other (See Comments)    Penicillins Itching     All cillins    Sulfamethoxazole-Trimethoprim Hives      Prior to Admission medications    Medication Sig Start Date End Date Taking?  Authorizing Provider   aspirin 81 MG chewable tablet Take 81 mg by mouth 7/31/19   Ar Automatic Reconciliation   atorvastatin (LIPITOR) 80 MG tablet Take 80 mg by mouth 8/20/19   Ar Automatic Reconciliation   diphenoxylate-atropine (LOMOTIL) 2.5-0.025 MG per tablet TAKE 1 TABLET BY MOUTH TWICE DAILY AS NEEDED 8/25/20   Ar Automatic Reconciliation   donepezil (ARICEPT) 5 MG tablet Take 5 mg by mouth    Ar Automatic Reconciliation   fexofenadine (ALLEGRA) 180 MG tablet Take by mouth    Ar Automatic Reconciliation   finasteride (PROSCAR) 5 MG tablet Take 5 mg by mouth daily 6/17/22   Ar Automatic Reconciliation   Fluticasone Propionate, Inhal, 50 MCG/ACT AEPB Inhale 2 puffs into the lungs    Ar Automatic Reconciliation   furosemide (LASIX) 40 MG tablet ceived the following from [facility]: Outside name: furosemide (LASIX) 40 mg tablet 10/7/19   Ar Automatic Reconciliation   lisinopril (PRINIVIL;ZESTRIL) 5 MG tablet Take 5 mg by mouth 8/20/19   Ar Automatic Reconciliation   metoprolol succinate (TOPROL XL) 25 MG extended release tablet ceived the following from [facility]: Outside name: metoprolol succinate (TOPROL-XL) 25 mg XL tablet 8/20/19   Ar Automatic Reconciliation   potassium citrate (UROCIT-K) 10 MEQ (1080 MG) extended release tablet Take 10 mEq by mouth 2 times daily (with meals)
RETROPERITONEAL COMPLETE    Result Date: 6/13/2023  1. 9 x 4.4 x 9.5 cm posterior mass in the urinary bladder. 2. Echogenic debris within the urinary bladder lumen. 3. Mild bilateral renal pelvocaliectasis with increased echotexture suggesting hemorrhagic or proteinaceous material. 4. Medical renal disease with increased renal echotexture bilaterally.              Timur Montoya MD  6/14/2023

## 2023-06-15 NOTE — PROGRESS NOTES
provided to: Patient   Patient Representative Name:       The Patient and/or Patient Representative Agree with the Discharge Plan?  Yes    Tom Ness  Case Management Department  Ph: [phone] Fax: n/a

## 2023-06-15 NOTE — PROGRESS NOTES
ventricular response  Left axis deviation  Septal infarct (cited on or before 13-JUN-2023)  Abnormal ECG  When compared with ECG of 13-JUN-2023 08:49,  Atrial fibrillation has replaced Sinus rhythm  Vent.  rate has increased BY  57 BPM  ST more depressed in Anterior leads  Nonspecific T wave abnormality no longer evident in Inferior leads  T wave inversion no longer evident in Anterior leads  Confirmed by Juan Vences MD, Zachary Haskins ([id number]) on 6/14/2023 10:08:36 PM         Colin Cr MD

## 2023-06-15 NOTE — PROGRESS NOTES
Hospital day several    Subjective:     Mr. Mitali Santizo has  multiple issues. Medication side effects: none    Scheduled Meds:   anidulafungin  100 mg IntraVENous Q24H    vancomycin  750 mg IntraVENous Once    [START ON 6/16/2023] vancomycin (VANCOCIN) intermittent dosing (placeholder)   Other Once    midodrine  2.5 mg Oral TID WC    heparin (porcine)  5,000 Units SubCUTAneous 3 times per day    pantoprazole (PROTONIX) 40 mg injection  40 mg IntraVENous Daily    sodium chloride flush  5-40 mL IntraVENous 2 times per day    vancomycin (VANCOCIN) intermittent dosing (placeholder)   Other RX Placeholder    atorvastatin  80 mg Oral Nightly    carbidopa-levodopa  1 tablet Oral BID    cefepime  1,000 mg IntraVENous Q24H    albumin human 25%  25 g IntraVENous Q6H     Continuous Infusions:   dextrose 50 mL/hr at 06/15/23 0940    [Held by provider] norepinephrine Stopped (06/15/23 1242)    [Held by provider] dilTIAZem Stopped (06/14/23 0849)     PRN Meds:metoprolol, potassium chloride **OR** potassium alternative oral replacement **OR** potassium chloride, acetaminophen **OR** acetaminophen      Objective:      Physical Exam:     General:  A lethargic but follows simple commands stop smoking mass facies cooperative, no distress, appears stated age. Head: Normocephalic, without obvious abnormality, atraumatic. Eyes:  Conjunctivae/corneas clear. PERRL, EOMs intact. Nose: Nares normal. No drainage or sinus tenderness. Neck: Supple, symmetrical, trachea midline, no adenopathy, thyroid: no enlargement, no carotid bruit and no JVD. Lungs:   Clear to auscultation bilaterally. Heart:  Regular rate and rhythm, S1, S2 normal.     Abdomen: Soft, non-tender. Bowel sounds normal.    Extremities: Extremities normal, atraumatic, no cyanosis or edema. Pulses: 2+ and symmetric all extremities. Skin:  No rashes or lesions   Neurologic: AAOx self only no focal motor or sensory deficit.    Intention tremor  Verduzco with

## 2023-06-15 NOTE — PROGRESS NOTES
Palliative Medicine follow-up note  DR. HUITRON'[facility]: [phone])  [facility]: [phone]     Patient Name: Jan Peter  YOB: 1940    Date of Initial Consult: 6/14/2023  Date of service:6/15/23  Reason for Consult: goals of care discussion/symptoms management  Requesting Provider:  Mikaela Graves MD  Primary Care Physician: Santiago Lewis MD      SUMMARY:     Jan Peter is a 79 y.o. with a past history of A-fib, coronary artery disease, DVT, BPH s/p Verduzco, who was admitted on 6/13/2023 from long-term care nursing facility with a diagnosis of sepsis, UTI and acute on chronic kidney disease. Patient initially presented from nursing home for further evaluation of increasing lethargy and confusion with generalized weakness. Patient was on diuretics reportedly and was found to have worsening renal function. In the emergency room, patient had minimal urinary output and CT scan was done showed air-fluid level in the bladder. Urology and nephrology services were consulted. Patient had ultrasound done showed posterior mass and urinary bladder. Patient was also noted to have hypotension was started on IV pressor and admitted to ICU. He was also started on IV antibiotic for sepsis management. Due to intermittent agitation, patient was started on soft restraints. Current medical issues leading to Palliative Medicine involvement include: To establish goals of care. 6/15/23: Patient was seen in presence of palliative care RN and patient's prognosis. Patient is very sleepy/lethargic. He did not get any sedation today. No obvious shortness of breath or cough or nausea. As per nursing, he ate his breakfast this morning. 6/14/2023: Patient was seen in presence of palliative care RN. Patient is very lethargic and sleepy currently.   Patient has bilateral soft wrist restraints as he was trying to get out of the bed and pulling his Verduzco catheter per

## 2023-06-16 NOTE — PROGRESS NOTES
placed by urology  Endocrine: Monitor BS. SSI. Neurology: severe parkinson's disease, dementia, awake confused, following simple commands   Toxicology: none  Pain/Sedation: prn  Skin/Wound: none  Electrolytes: Replace electrolytes per ICU electrolyte replacement protocol. IVF: yes due to Acute renal failure   Nutrition: speech eval  Prophylaxis: DVT Prophylaxis: SCD/pvl first and start sq heparin . GI Prophylaxis: Protonix/ppi. Restraints: none    Lines/Tubes: PIV  Verduzco placed by urology obstructive uropathy 6/13/2023  Tlc left internal jugular TLC  Other:  PT/OT eval and treat. OOB. Advance Directive/Palliative Care: consulted    Will defer respective systems problem management to primary and other respective consultant and follow patient in ICU with primary and other medical team.  Further recommendations will be based on the patient's response to recommended treatment and results of the investigation ordered. Quality Care: PPI, DVT prophylaxis, HOB elevated, Infection control all reviewed and addressed. Care of plan d/w hospitalist team, RN, RT, MDR.  D/w patient, family- (answered all questions to satisfaction). High complexity decision making was performed during the evaluation of this patient at high risk for decompensation with multiple organ involvement. Total critical care time spent rendering care exclusive of procedures/family discussion/coordination of care: 40 minutes. Subjective/History of Present Illness:     Patient is a 79 y.o. male with PMHx significant for Parkinson's disease, DNR, coronary artery disease status post stent placement 2018 on Brilinta, coronary artery disease, cardiomyopathy, nursing home resident, chronic renal insufficiency, recently found to have bladder mass. Patient presents with confusion, hypotension, acute on chronic renal failure, found to have bloody urine, urinary tract infection and large mass in the bladder compressing. On ureter.   Patient

## 2023-06-16 NOTE — PROGRESS NOTES
Palliative Medicine follow-up progress note  DR. HUITRON'S [facility]: [phone])  [facility]: [phone]     Patient Name: Peggy Patel  YOB: 1940    Date of Initial Consult: 6/14/2023  Date of service:6/16/23  Reason for Consult: goals of care discussion/symptoms management  Requesting Provider:  Joe Aguirre MD  Primary Care Physician: Stacie Baumann MD      SUMMARY:     Peggy Patel is a 79 y.o. with a past history of A-fib, coronary artery disease, DVT, BPH s/p Verduzco, who was admitted on 6/13/2023 from long-term care nursing facility with a diagnosis of sepsis, UTI and acute on chronic kidney disease. Patient initially presented from nursing home for further evaluation of increasing lethargy and confusion with generalized weakness. Patient was on diuretics reportedly and was found to have worsening renal function. In the emergency room, patient had minimal urinary output and CT scan was done showed air-fluid level in the bladder. Urology and nephrology services were consulted. Patient had ultrasound done showed posterior mass and urinary bladder. Patient was also noted to have hypotension was started on IV pressor and admitted to ICU. He was also started on IV antibiotic for sepsis management. Due to intermittent agitation, patient was started on soft restraints. Current medical issues leading to Palliative Medicine involvement include: To establish goals of care. 6/16/23: Patient was seen in presence of palliative care medical social worker and patient's sister. Patient is awake today. Denies having any pain but it is hard to understand his speech. Patient recognizes his sister. No new issues per nursing overnight other than having hypotension and tachycardia. 6/15/23: Patient was seen in presence of palliative care RN and patient's prognosis. Patient is very sleepy/lethargic. He did not get any sedation today.   No obvious shortness of

## 2023-06-16 NOTE — PROGRESS NOTES
Possible fungal ball bladder mass? I will discuss with urology  ADRIA Peraza MD  Pulmonary and Critical Care

## 2023-06-16 NOTE — PROGRESS NOTES
Speech-Language Pathology Dysphagia Treatment Attempt  1145: Attempted to see patient for follow up diagnostic dysphagia therapy. Patient seeming awake upon ST arrival, scratching head, but did not attend to ST multiple attempts to engage with offers of water. Patient lethargic and confused at baseline currently, not appropriate for treatment at this time. Discussed diet tolerance at length with KENRICK Arzate, who reports that minced and moist does not seem the best for him, as he is very much unable to chew and just lets it sit in his mouth. Reports that he does well with oatmeal, thin liquids, and pills in apple sauce. Recommend diet downgrade to puree to see how he does on it. KENRICK Arzate in agreement. Will follow up later as patient's schedule allows.    Pawan Ladd M.S., CCC-SLP

## 2023-06-16 NOTE — PROGRESS NOTES
Hospice referral received. Chart review in process. Per CM note, family is pursing hospice services through the [address] will continue following at a distance until discharge. Thank you for the referral to [facility]. If we can be of further assistance please contact [phone].      Saba Madsen MDIV, BSN, RN  Hospice Liaison  [facility]  [address]  Office: [phone]  Fax: [phone]  Mobile:  [phone]  Email: [email or web address]

## 2023-06-16 NOTE — PROGRESS NOTES
Hospitalist Progress Note    Patient: Dhruv Marquez MRN: [id number]  CSN: [id number]    YOB: 1940  Age: 79 y.o. Sex: male    DOA: 6/13/2023 LOS:  LOS: 3 days          Chief Complaint:    79year old male hx of Afib, dementia, Parkinson, CAD on bril lenta admitted with worsening mentation and sepsis from UTI and bladder mass      Assessment/Plan   Active Problems:  Cv  Paroxysmal Atrial Fibrillation improved weaned offLevophed   Worsened by sepsis  Peak troponin cardio following on prn Metoprolol  Off levophed increase midrin  Echo noted ef down to 40    Holding Brillenta due to hematuria now improved  No recent stent but known CAD     Pulmonary  Pleural effusion  Respiratory support  Hold diuretics     Nephrology  MICHAEL post obstructive  No need for urgent dialysis slowly improved  Potassium replacement  Strict I O  Fluids  Hold Ace hold diuretics  Nephrology consulted      Heme  Hematuria  Hold heparin bril lenta  Monitor cbc platelets    drop noted  dilutional   Check stool cards as well    GI  IV Protonixx  Hemoccult stools       Bladder Mass wuth  UTI ?  Fungal ball since has fungemia disucsssed with urology   Recomends repeat CT since bladder now decompressed  IV abx  Urology consulted  Urine culter GBS klebsella no fungus  Repeat UA     Nuero  Dementia  Parkinson's  Restart sinemet  aricept     ID   Now with fungemia  On exsaresis  Cefipime for UTI klebsella    FEN  Hypernatremia  Now on d%  Disposition :tbd  Active Hospital Problems    Diagnosis Date Noted    Urge incontinence [N39.41] 04/10/2019     Priority: Medium    Hypotension [I95.9] 06/14/2023    Troponin level elevated [R77.8] 06/14/2023    Palliative care encounter [Z51.5] 06/14/2023    Goals of care, counseling/discussion [Z71.89] 06/14/2023    DNR (do not resuscitate) discussion [Z71.89] 06/14/2023    Poor prognosis [Z78.9] 06/14/2023    UTI (urinary tract infection) [N39.0] 06/13/2023    Sepsis ([address]) [A41.9] 06/13/2023    DVT

## 2023-06-16 NOTE — PROGRESS NOTES
PCR Not detected        Proteus by PCR Not detected        Salmonella species by PCR Not detected        Serratia marcescens by PCR Not detected        Haemophilus Influenzae by PCR Not detected        Neisseria meningitidis by PCR Not detected        Pseudomonas aeruginosa Not detected        Stenotrophomonas maltophilia by PCR Not detected        Candida albicans by PCR Not detected        Candida auris by PCR Not detected        Candida glabrata Not detected        Candida krusei by PCR Not detected        Candida parapsilosis by PCR Detected        Candida tropicalis by PCR Detected        Cryptococcus neoformans/gattii by PCR Not detected        Resistant gene targets            [employer] test comment       False positive results may rarely occur. Correlate with clinical,epidemiologic, and other laboratory findings           Comment: Please see BCID Interpretation Guide in EPIC Links                 Imaging: All imaging reviewed from Admission to present as per radiology interpretation in [facility]    Radiology report last 24 hours:    CT ABDOMEN PELVIS WO CONTRAST Additional Contrast? None    Result Date: 6/13/2023  EXAM: CT ABDOMEN PELVIS WO CONTRAST INDICATION: abdominal pain COMPARISON: Earlier renal ultrasound IV CONTRAST: None. ORAL CONTRAST: None. TECHNIQUE: Thin axial images were obtained through the abdomen and pelvis. Coronal and sagittal reformats were generated. CT dose reduction was achieved through use of a standardized protocol tailored for this examination and automatic exposure control for dose modulation. The absence of intravenous and oral contrast material reduces the sensitivity for evaluation of the bowel, vasculature and solid organs. FINDINGS: LOWER THORAX: Moderate right and small left pleural effusions. Dependent atelectasis in right lower lobe. LIVER: No mass. BILIARY TREE: Status post cholecystectomy. CBD is not dilated. SPLEEN: within normal limits.  PANCREAS: No focal

## 2023-06-16 NOTE — PROGRESS NOTES
Patient and sister to meet with hospice today at 1300 at bedside. Patient was under skilled care prior to admission, so if patient does return to SNF, with hospice, he will be private pay. SW notified facility of the potential so they can reach out to family regarding financial plan.

## 2023-06-16 NOTE — PROGRESS NOTES
D/C Plan: Discharge to hospice at [facility] facility pending acceptance     CM met with patient sister at bedside. CM discussed tions for hospice at facility, patient's sister has POA but the patient has not given her a copy and can not remember where it is due to his dementia. Sister stated he is registered at the [facility]. CM discussed the option of facility hospice through the [facility] if patient is eligible. Per sister Nelda David, patient is registered at the [facility] [place] and the family would like to pursue facility hospice through the [facility]. CM called the [facility] hospice coordinator, who confirmed patient is registered with the [facility] and they already show his sister Nelda David as his next of kin. CMS referral placed to send clinicals to the [facility]. [facility].

## 2023-06-17 NOTE — PROGRESS NOTES
6/16/23  IMPRESSION:  1. No acute intra-abdominal pathology. 2.  Mild to moderate ascites. Moderate bilateral pleural effusions. 3.  Repositioned Verduzco catheter in the urinary bladder which is nondistended           Specimen Collected: 06/16/23 15:48 EDT           Impression:     MICHAEL- obstructive uropathy.  MICHAEL improving, S Cr 12.5 on admission => 3.19 today  Bilateral hydro/BPH  AMS  UTI  Candidemia  Anemia  CAD  Parkinsons  Bladder mass  Met acidosis, improved    Plan:   Encourage po intake  Abx, antifungal  Verduzco,   Monitor I/O's  Monitor chemistry  D/w ICU staff, will cont follow    MD Ilan Rey  [phone]

## 2023-06-17 NOTE — PROGRESS NOTES
[facility]  Pulmonary, Critical Care, and Sleep Medicine    Name: Elinor Bethea MRN: [id number]   : 1940 Hospital: [facility]    Date: 2023  Room: [address] [place] [address] Note                                              Consult requesting physician: Dr. Alicia Feldman    Reason for Consult: septic shock, acute renal failure. Elevated troponin, change in mental sttaus       IMPRESSION:   Septic shock  Acute renal failure  Atrial fibrillation with RVR  Hypertension  Urosepsis  History of coronary artery disease  Change in mental status     Patient Active Problem List   Diagnosis    GERD (gastroesophageal reflux disease)    Essential hypertension    Urge incontinence    Genital herpes    DVT (deep venous thrombosis) ([facility])    Venous (peripheral) insufficiency    History of ST elevation myocardial infarction (STEMI)    Urinary frequency    Weak urinary stream    MCI (mild cognitive impairment)    Enlarged prostate with lower urinary tract symptoms (LUTS)    History of complex partial epilepsy    Infected sebaceous cyst of skin    Idiopathic peripheral neuropathy    Chronic indwelling Verduzco catheter    Chest pain    Shortness of breath    Atrial fibrillation with rapid ventricular response (HCC)    Nocturia    Urinary retention    UTI (urinary tract infection)    Sepsis ([facility])    Hypotension    Troponin level elevated    Palliative care encounter    Goals of care, counseling/discussion    DNR (do not resuscitate) discussion    Poor prognosis     Code status: DNR/DNI        RECOMMENDATIONS:   Respiratory:   CXR 6/15/2022: Pulmonary edema and pleural effusions. Diuretics as below. Appears protecting airway well. O2 NC; titrate to keep SPO2 > 91%. Incentive spirometer. DNR/DNI status. Keep SPO2 >=92%. HOB 30 degree elevation all the time. Aggressive pulmonary toileting. Aspiration precautions. Incentive spirometry.     CVS:  Initially admitted with shock, likely septic

## 2023-06-17 NOTE — PROGRESS NOTES
Cardiology Progress Note        Patient: Carlos Borja        Sex: male          DOA: 6/13/2023  YOB: 1940      Age:  79 y.o.        LOS:  LOS: 3 days     Patient seen and examined, chart reviewed. Assessment/Plan     Patient Active Problem List   Diagnosis    GERD (gastroesophageal reflux disease)    Essential hypertension    Urge incontinence    Genital herpes    DVT (deep venous thrombosis) (HCC)    Venous (peripheral) insufficiency    History of ST elevation myocardial infarction (STEMI)    Urinary frequency    Weak urinary stream    MCI (mild cognitive impairment)    Enlarged prostate with lower urinary tract symptoms (LUTS)    History of complex partial epilepsy    Infected sebaceous cyst of skin    Idiopathic peripheral neuropathy    Chronic indwelling Verduzco catheter    Chest pain    Shortness of breath    Atrial fibrillation with rapid ventricular response ([facility])    Nocturia    Urinary retention    UTI (urinary tract infection)    Sepsis ([facility])    Hypotension    Troponin level elevated    Palliative care encounter    Goals of care, counseling/discussion    DNR (do not resuscitate) discussion    Poor prognosis      Atrial fibrillation with rapid ventricular rate     Echocardiogram reported      Left Ventricle: Normal left ventricular systolic function with a visually estimated EF of 40 - 45%. Left ventricle size is normal. Normal wall thickness. See diagram for wall motion findings. Grade III diastolic dysfunction with increased LAP. Right Ventricle: Hyperdynamic systolic function. Global longitudinal strain is -30.5%. RV Peak S' is 16 cm/s. TDI systolic excursion is normal. RV free wall strain is normal.    Aortic Valve: Trileaflet valve. Mild regurgitation. Tricuspid Valve: Moderately elevated RVSP. Est RA pressure is 15 mmHg. The estimated RVSP is 65 mmHg. Left Atrium: Left atrium is severely dilated.  Left atrial volume index is severely increased (>48

## 2023-06-17 NOTE — PROGRESS NOTES
Physician Progress Note      PATIENT:               Otto Greer  CSN #:                  [id number]  :                       1940  ADMIT DATE:       2023 8:40 AM  DISCH DATE:  RESPONDING  PROVIDER #:        Deanna Vanegas MD          QUERY TEXT:    Pt admitted with sepsis from UTI and bladder mass and AMS . Pt noted to have   admitted to ED with indwelling  blackwood cath  and has   Candidemia with   C.parapsiliosis-suspect urinary source. If possible, please document in the   progress notes and discharge summary if you are evaluating and / or treating   any of the following: The medical record reflects the following:    Risk Factors:82 y.o. with a past history of A-fib, coronary artery disease,   DVT, BPH s/p Blackwood, who was admitted on 2023 from long-term care nursing   facility with a diagnosis of sepsis, UTI and acute on chronic kidney disease. Clinical Indicators: ID PN -Septic shock  with organ injury-- leucocytosis/   renal failure/ elevated cardiac enzymes , Candidemia with C.parapsiliosis--   suspect urinary source, with indwelling blackwood    Treatment: IV Diflucan, IV maxipime and IV Vanco ,ID and urology  consults   ,blackwood changed in ED    Thank you  Nikole Scott RN CRCR CDI  , [facility] /[facility]  [email or web address]  Options provided:  -- Candida Sepsis due to UTI  related to indwelling  blackwood catheter  -- Sepsis due to UTI  related to indwelling  blackwood cathether  -- Candida Sepsis due to UTI unrelated to indwelling  blackwood catheter  -- Sepsis due to UTI unrelated to  indwelling  blackwood catheter  -- Other - I will add my own diagnosis  -- Disagree - Not applicable / Not valid  -- Disagree - Clinically unable to determine / Unknown  -- Refer to Clinical Documentation Reviewer    PROVIDER RESPONSE TEXT:    This patient has Candida Sepsis due to UTI related to indwelling blackwood   catheter. Query created by:  Gin Shabazz on 2023 8:08 PM      Electronically signed by:

## 2023-06-18 NOTE — PLAN OF CARE
Problem: Discharge Planning  Goal: Discharge to home or other facility with appropriate resources  Outcome: Progressing  Flowsheets (Taken 6/18/2023 0840)  Discharge to home or other facility with appropriate resources:   Identify barriers to discharge with patient and caregiver   Arrange for needed discharge resources and transportation as appropriate   Identify discharge learning needs (meds, wound care, etc)     Problem: Safety - Adult  Goal: Free from fall injury  Outcome: Progressing     Problem: Pain  Goal: Verbalizes/displays adequate comfort level or baseline comfort level  Outcome: Progressing     Problem: Safety - Medical Restraint  Goal: Remains free of injury from restraints (Restraint for Interference with Medical Device)  Description: INTERVENTIONS:  1. Determine that other, less restrictive measures have been tried or would not be effective before applying the restraint  2. Evaluate the patient's condition at the time of restraint application  3. Inform patient/family regarding the reason for restraint  4. Q2H: Monitor safety, psychosocial status, comfort, nutrition and hydration  Outcome: Progressing     Problem: Skin/Tissue Integrity  Goal: Absence of new skin breakdown  Description: 1. Monitor for areas of redness and/or skin breakdown  2. Assess vascular access sites hourly  3. Every 4-6 hours minimum:  Change oxygen saturation probe site  4. Every 4-6 hours:  If on nasal continuous positive airway pressure, respiratory therapy assess nares and determine need for appliance change or resting period.   Outcome: Progressing

## 2023-06-18 NOTE — PROGRESS NOTES
SW spoke with patients sister regarding DC plan. Sister stated that she reached out to the [address] [place] [address] regarding possible hospice care upon DC. Patients sister stated that she is still deciding on what route to take upon DC. SW explained all options with patients sister at this time. Sister is going to reach out to  regarding financial assist with Chiara Pina, executor of estate, Monday 6/19. Patient feels very anxious about process and stated that she is \"upset \" that patient has left such a mess for her to figure out. Patient stated that he planned everything AFTER his death, not prior and now family is having to backtrack and find info needed to assist patient now. Patients sister does not have resources to assist with payment for LTC at this time at [address]. 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 Sister hopeful that his  will be able to sort through financial aspect to move forward with plan. Patient DOES have resources to assist with LTC funds once accounts can be accessed. SW to follow.

## 2023-06-19 PROBLEM — I50.42 CHRONIC COMBINED SYSTOLIC AND DIASTOLIC CHF (CONGESTIVE HEART FAILURE) (HCC): Status: ACTIVE | Noted: 2023-01-01

## 2023-06-19 PROBLEM — Z66 DNR (DO NOT RESUSCITATE): Status: ACTIVE | Noted: 2023-01-01

## 2023-06-19 PROBLEM — J81.1 PULMONARY EDEMA: Status: ACTIVE | Noted: 2023-01-01

## 2023-06-19 PROBLEM — N32.89 BLADDER MASS: Status: ACTIVE | Noted: 2023-01-01

## 2023-06-19 PROBLEM — I48.0 PAF (PAROXYSMAL ATRIAL FIBRILLATION) (HCC): Status: ACTIVE | Noted: 2023-01-01

## 2023-06-19 PROBLEM — B49 FUNGEMIA: Status: ACTIVE | Noted: 2023-01-01

## 2023-06-19 PROBLEM — N17.9 AKI (ACUTE KIDNEY INJURY) (HCC): Status: ACTIVE | Noted: 2023-01-01

## 2023-06-19 NOTE — PROGRESS NOTES
Nephrology Progress note    Subjective:     Hollie Pelletier is a 79 y.o. male with PMH BPH. CAD, urinary retention + indwelling blackwood presents with AMS,decreased UOP, elevated Cr 12.5, K 4.5, CO2 20. Baseline Cr 1.1 on 5/23/23. C/p abd pain, decreased urination  Renal ultrasound with bilateral hydronephrosis, bladder mass  Blackwood changed by urology, around 2 L UOP    Pt transferred to the floor    Remains Lethargic      Admit Date: 6/13/2023      Allergy:  Allergies   Allergen Reactions    Clindamycin Hives     Generalized red body rash    Codeine Other (See Comments)    Penicillins Itching     All cillins    Sulfamethoxazole-Trimethoprim Hives        Objective:     BP (!) 108/57   Pulse 67   Temp 98.1 °F (36.7 °C) (Axillary)   Resp 22   Ht 5' 8\" (1.727 m)   Wt 158 lb 8 oz (71.9 kg)   SpO2 100%   BMI 24.10 kg/m²     No intake or output data in the 24 hours ending 06/19/23 1141      Physical Exam:       General: No acute distress   HENT: Atraumatic and normocephalic   Eyes: Normal conjunctiva   Neck: Supple No JVD   Cardiovascular: Normal S1 & S2, no m/r/g   Pulmonary/Chest Wall: Clear to auscultation bilaterally   Abdominal: Soft and non-tender   Musculoskeletal: no edema   Neurological: No focal deficits       Data Review:    Lab Results   Component Value Date/Time    WBC 11.8 06/16/2023 05:35 AM    RBC 2.42 (L) 06/16/2023 05:35 AM    HCT 23.4 (L) 06/16/2023 05:35 AM    MCV 96.7 06/16/2023 05:35 AM    MCH 31.0 06/16/2023 05:35 AM    MCHC 32.1 06/16/2023 05:35 AM    RDW 14.7 (H) 06/16/2023 05:35 AM    MPV 9.7 06/16/2023 05:35 AM      Lab Results   Component Value Date    IRON 52 06/14/2023     Lab Results   Component Value Date    FERRITIN 959 (H) 06/14/2023     No results found for: PTHINT  Urinalysis  No results found for: SOURCEUR     CT  6/16/23  IMPRESSION:  1. No acute intra-abdominal pathology. 2.  Mild to moderate ascites. Moderate bilateral pleural effusions.      3.  Repositioned Blackwood catheter

## 2023-06-19 NOTE — PROGRESS NOTES
Speech-Language Pathology Treatment Attempt  1540: Attempted to see patient for follow up dysphagia treatment and diet tolerance monitoring. Sitter present at bedside upon ST arrival. Patient lethargic, not answering questions, but awake. Sitters reports patient not eating much, appears to be doing well with puree, but sometimes coughs with thin liquid. Trialed sip of water +straw with delayed wet cough noted following swallow. Sitter reported that patient does better without straw. Patient declined to participate in additional PO trials. May assess nectar thick tomorrow. At this time, recommend continue pureed diet and thin liquids w/o straws as able and strict aspiration precautions. Will follow up tomorrow.  Thank you for this referral,  Robert Caceres M.S., CCC-SLP

## 2023-06-19 NOTE — PROGRESS NOTES
Pt with increased oxygen requirement and decreased urine output. Will do small dose of lasix and IV fluids have been dc'ed as he is very fluid overloaded.

## 2023-06-19 NOTE — PALLIATIVE CARE
Palliative Medicine   Telephone conversation with sister, Socorro Khalil. She has agreed to proceed with initiation of comfort measures for her brother. We were able to locate the AMD naming Socorro Khalil as her sole MPOA. Placed on the chart. Hospitalist, clinical nurse, and care manager notified of changes.     Noe Morrison RN, MSN

## 2023-06-19 NOTE — PROGRESS NOTES
Cardiology Progress Note        Patient: Heriberto Salas        Sex: male          DOA: 6/13/2023  YOB: 1940      Age:  79 y.o.        LOS:  LOS: 5 days       Patient seen and examined, chart reviewed    Assessment/Plan     Patient Active Problem List   Diagnosis    GERD (gastroesophageal reflux disease)    Essential hypertension    Urge incontinence    Genital herpes    DVT (deep venous thrombosis) ([facility])    Venous (peripheral) insufficiency    History of ST elevation myocardial infarction (STEMI)    Urinary frequency    Weak urinary stream    MCI (mild cognitive impairment)    Enlarged prostate with lower urinary tract symptoms (LUTS)    History of complex partial epilepsy    Infected sebaceous cyst of skin    Idiopathic peripheral neuropathy    Chronic indwelling Verduzco catheter    Chest pain    Shortness of breath    Atrial fibrillation with rapid ventricular response ([facility])    Nocturia    Urinary retention    UTI (urinary tract infection)    Sepsis ([facility])    Hypotension    Troponin level elevated    Palliative care encounter    Goals of care, counseling/discussion    DNR (do not resuscitate) discussion    Poor prognosis      Atrial fibrillation with rapid ventricular rate     Echocardiogram reported      Left Ventricle: Normal left ventricular systolic function with a visually estimated EF of 40 - 45%. Left ventricle size is normal. Normal wall thickness. See diagram for wall motion findings. Grade III diastolic dysfunction with increased LAP. Right Ventricle: Hyperdynamic systolic function. Global longitudinal strain is -30.5%. RV Peak S' is 16 cm/s. TDI systolic excursion is normal. RV free wall strain is normal.    Aortic Valve: Trileaflet valve. Mild regurgitation. Tricuspid Valve: Moderately elevated RVSP. Est RA pressure is 15 mmHg. The estimated RVSP is 65 mmHg. Left Atrium: Left atrium is severely dilated.  Left atrial volume index is severely increased acetaminophen (TYLENOL) suppository 650 mg  650 mg Rectal Q6H PRN    sodium chloride flush 0.9 % injection 5-40 mL  5-40 mL IntraVENous 2 times per day    atorvastatin (LIPITOR) tablet 80 mg  80 mg Oral Nightly    carbidopa-levodopa (SINEMET)  MG per tablet 1 tablet  1 tablet Oral BID    cefepime (MAXIPIME) 1,000 mg in sodium chloride 0.9 % 50 mL IVPB (mini-bag)  1,000 mg IntraVENous Q24H    albumin human 25% IV solution 25 g  25 g IntraVENous Q6H               Lab/Data Reviewed:       Recent Labs     06/16/23  0535   WBC 11.8   HGB 7.5*   HCT 23.4*          Recent Labs     06/16/23  0535 06/16/23  1223 06/17/23  0500 06/17/23  1013 06/18/23  1055   *  --  145  --  142   K 3.3*   < > 3.3* 4.5 4.4   *  --  116*  --  113*   CO2 22  --  23  --  20*   BUN 81*  --  77*  --  92*    < > = values in this interval not displayed.          Signed By: Chi Campbell MD     June 18, 2023

## 2023-06-19 NOTE — PLAN OF CARE
Problem: Discharge Planning  Goal: Discharge to home or other facility with appropriate resources  Outcome: Progressing     Problem: Safety - Adult  Goal: Free from fall injury  Outcome: Progressing     Problem: Pain  Goal: Verbalizes/displays adequate comfort level or baseline comfort level  Outcome: Progressing     Problem: Safety - Medical Restraint  Goal: Remains free of injury from restraints (Restraint for Interference with Medical Device)  Description: INTERVENTIONS:  1. Determine that other, less restrictive measures have been tried or would not be effective before applying the restraint  2. Evaluate the patient's condition at the time of restraint application  3. Inform patient/family regarding the reason for restraint  4. Q2H: Monitor safety, psychosocial status, comfort, nutrition and hydration  Outcome: Progressing     Problem: Skin/Tissue Integrity  Goal: Absence of new skin breakdown  Description: 1. Monitor for areas of redness and/or skin breakdown  2. Assess vascular access sites hourly  3. Every 4-6 hours minimum:  Change oxygen saturation probe site  4. Every 4-6 hours:  If on nasal continuous positive airway pressure, respiratory therapy assess nares and determine need for appliance change or resting period.   Outcome: Progressing

## 2023-06-19 NOTE — PROGRESS NOTES
Hospitalist Progress Note    Patient: Desmond Bentley MRN: [id number]  CSN: [id number]    YOB: 1940  Age: 79 y.o. Sex: male    DOA: 6/13/2023 LOS:  LOS: 6 days          Chief Complaint:    79year old male hx of Afib, dementia, Parkinson, CAD on bril lenta admitted with worsening mentation and sepsis from UTI and bladder mass      Assessment/Plan     Active Hospital Problems    Diagnosis Date Noted    Urge incontinence [N39.41] 04/10/2019     Priority: Medium    Hypotension [I95.9] 06/14/2023    Troponin level elevated [R77.8] 06/14/2023    Palliative care encounter [Z51.5] 06/14/2023    Goals of care, counseling/discussion [Z71.89] 06/14/2023    DNR (do not resuscitate) discussion [Z71.89] 06/14/2023    Poor prognosis [Z78.9] 06/14/2023    UTI (urinary tract infection) [N39.0] 06/13/2023    Sepsis ([facility] [address] [A41.9] 06/13/2023    DVT (deep venous thrombosis) ([facility] [address] [I82.409] 02/12/2015        Pleural effusion-worsening for cxr today   Respiratory support  Low dose lasix -AM ,will give another dose lasix if bp still good after holding albumin   Will hold albumin-bp improving      Cad and paf , chf -combined systolic and diastolic dysfunction   On low dose metoprolol   Ef 40-45 %     MICHAEL post obstructive- blackwood replaced , worsening   No need for urgent dialysis slowly improved  Potassium replacement  Strict I O  Fluids  Hold Ace hold diuretics  Nephrology consulted   Renal ultrasound no hydro      Hematuria  Hold heparin bril lenta  Monitor cbc platelets    drop noted  dilutional      Dvt not good candidate for ac due to hematuria   Bladder Mass   UTI ?  Fungal ball since has fungemia disucsssed with urology   Recomends repeat CT since bladder now decompressed  IV abx  Urology consulted  Urine culter GBS klebsella no fungus  Repeat UA       Dementia  Parkinson's  On sinemet  aricept      fungemia  On exsaresis    UTI   Cefipime for UTI klebsella      Progress is poor with fungemia , reconsult palliative for input(s): CKTOTAL, CKMB, TROPONINI in the last 72 hours. CBC:   Lab Results   Component Value Date/Time    WBC 11.8 06/16/2023 05:35 AM    RBC 2.42 06/16/2023 05:35 AM    HGB 7.5 06/16/2023 05:35 AM    HCT 23.4 06/16/2023 05:35 AM    MCV 96.7 06/16/2023 05:35 AM    MCH 31.0 06/16/2023 05:35 AM    MCHC 32.1 06/16/2023 05:35 AM    RDW 14.7 06/16/2023 05:35 AM     06/16/2023 05:35 AM    MPV 9.7 06/16/2023 05:35 AM        Last 3 CBC:   No results for input(s): WBC, RBC, HGB, HCT, MCV, MCH, MCHC, RDW, PLT, MPV in the last 72 hours. WBC:   Lab Results   Component Value Date/Time    WBC 11.8 06/16/2023 05:35 AM        Last 3 WBC:   No results for input(s): WBC in the last 72 hours. Platelets:   Lab Results   Component Value Date/Time     06/16/2023 05:35 AM        Last 3 Platelets:   No results for input(s): PLT in the last 72 hours. Hemoglobin/Hematocrit:   Lab Results   Component Value Date/Time    HGB 7.5 06/16/2023 05:35 AM    HCT 23.4 06/16/2023 05:35 AM        Last 3 Hemoglobin/Hematocrit:   No results for input(s): HGB, HCT in the last 72 hours. Hepatic Function Panel:   Lab Results   Component Value Date/Time    ALKPHOS 93 06/16/2023 05:35 AM    ALT 11 06/16/2023 05:35 AM    AST 58 06/16/2023 05:35 AM    PROT 7.2 06/16/2023 05:35 AM    BILITOT 1.0 06/16/2023 05:35 AM    LABALBU 4.1 06/16/2023 05:35 AM        Last 3 Hepatic Function Panel:   No results for input(s): ALKPHOS, ALT, AST, PROT, BILITOT, BILIDIR, LABALBU in the last 72 hours.      Albumin:   Lab Results   Component Value Date/Time    LABALBU 4.1 06/16/2023 05:35 AM        Calcium:   Lab Results   Component Value Date/Time    CALCIUM 8.9 06/19/2023 11:06 AM      Ionized Calcium: No results found for: IONCA   Magnesium:   Lab Results   Component Value Date/Time    MG 2.2 06/16/2023 05:35 AM      ABGs: No results found for: PHART, PO2ART, MXQ5DPM, BHN3JLH, BEART, L4TGJSLQ   Lactic Acid: No results found for: LACTA

## 2023-06-19 NOTE — PROGRESS NOTES
SW called and spoke with patients sister Maria Guadalupe Olsen regarding DC plan. Maria Guadalupe Olsen does want to pursue hospice at this time but is unable to reach out to the [place] today due to a holiday. SW unable to reach out to the [place] today as well due to holiday. SANTANA talked with sister about hospice house being a possibility. Maria Guadalupe Olsen in agreement with referral being sent. SANTANA called an spoke -with Almaz Mejia at [facility] -[phone] fax [phone] regarding referral. No open beds at this time, and two currently on waiting list. Clinical to be faxed by [facility]. SW to follow.

## 2023-06-19 NOTE — PROGRESS NOTES
Palliative Medicine follow-up progress note  DR. [facility]: [phone] ([phone]  [facility]: [phone]     Patient Name: Jabier Marie  YOB: 1940    Date of Initial Consult: 6/14/2023  Date of service:6/19/23  Reason for Consult: goals of care discussion/symptoms management  Requesting Provider:  Daria Coronado MD  Primary Care Physician: Erika Fonseca MD      SUMMARY:     Jabier Marie is a 79 y.o. with a past history of A-fib, coronary artery disease, DVT, BPH s/p Verduzco, who was admitted on 6/13/2023 from long-term care nursing facility with a diagnosis of sepsis, UTI and acute on chronic kidney disease. Patient initially presented from nursing home for further evaluation of increasing lethargy and confusion with generalized weakness. Patient was on diuretics reportedly and was found to have worsening renal function. In the emergency room, patient had minimal urinary output and CT scan was done showed air-fluid level in the bladder. Urology and nephrology services were consulted. Patient had ultrasound done showed posterior mass and urinary bladder. Patient was also noted to have hypotension was started on IV pressor and admitted to ICU. He was also started on IV antibiotic for sepsis management. Due to intermittent agitation, patient was started on soft restraints. Current medical issues leading to Palliative Medicine involvement include: To establish goals of care. 6/19/23: Patient is awake but unable to provide any medical histories. Patient has a sitter at bedside. He is very slow in response. Denies for having any pain. Appetite remains poor per sitter. No agitation per sitter. 6/16/23: Patient was seen in presence of palliative care medical social worker and patient's sister. Patient is awake today. Denies having any pain but it is hard to understand his speech. Patient recognizes his sister.   No new issues per nursing overnight

## 2023-06-19 NOTE — PROGRESS NOTES
[facility]  (A Division of [facility])                                                           Date of Admission: 6/13/2023   Date of Note: 6/19/2023  Reason for Consult:  Candidemia  Referring MD: Dr Hylton Fearing       Current Antimicrobials:    Prior Antimicrobials:  Eraxis-> Fluconazole 6/15 to date  Cefepime 6/13 to date   Levofloxacin X 1 week-- [facility]  Zosyn X1  Vancomycin 6/13 to 6/15   Allergy to antibiotics: PCN- itching, Clindamycin and Bactrim     Assessment--ID related:     Acutely Ill patient with:    Septic shock  with organ injury due to below-- leucocytosis/ renal failure/ elevated cardiac enzymes: Improving        Leucocytosis 19K- resolved- currently normal        MICHAEL persist        Confused    Acute hypoxemic respiratory failure--requiring oxygen- overnight    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 Urine culture 6/16- > 100K yeast     Large bladder mass on CT that was not present in Feb 2023- per Urology note. Inflammatory debri/ fungal ball? Urology following-- CT AP in [facility] 01/2023- no bladder mass mentioned    --Blood culture - 6/13- yeast in blood culture- 2/4-- Candida tropicalis/parapsiliosis on PCR/ C.parapsiliosis in blood culture  --Blood culture -6/15- [facility]  --6/13--UA with TNTC wbc, yeast and bacteria + , urine culture - Klebsiella and E.fecalis- amp Sensitive-   --6/16- Urine culture > 100K yeast  --TTE 45-86% EF, diastolic dysfunction. No mention of veg/mass on report    MICHAEL due to obstruction/ dheydration?  -cr 12 on admission: improving( cr was normal 05/2023)    History of fall/ clavicle fracture 05/2023 at [facility]--treated with Levofloxacin on discharge for E.clocae positive urine culture.      Active Hospital Problems    Diagnosis     Urge incontinence [N39.41]      Priority: Medium    Hypotension [I95.9]     Troponin level elevated [R77.8] RETROPERITONEUM: Abundant atherosclerotic calcification. No aneurysm demonstrated. No retroperitoneal REPRODUCTIVE ORGANS: Mild prostatomegaly. Urethral distention within the prostatic portion of the urethra measuring 3.5 x 3.0 cm transverse. URINARY BLADDER: Air-fluid level in the anterior portion of the urinary bladder with moderate urinary bladder distention. Urinary bladder measures 15.3 cm in length, 11.6 cm transverse and 8.6 cm AP. Ultrasound-demonstrated mass in the dependent portion of urinary bladder is not discriminated from adjacent fluid. BONES: No destructive bone lesion. ABDOMINAL WALL: No mass or hernia. ADDITIONAL COMMENTS: N/A     1. Mild bilateral renal pelvocaliectasis and ureteral dilation, moderate urinary bladder distention and prostatic urethral distention as above. Anterior air-fluid level in urinary bladder. Ultrasound as demonstrated posterior mass of the urinary bladder is not demonstrated on these images are  from adjacent urinary bladder fluid. 2. Moderate right and small left pleural effusions. CT HEAD WO CONTRAST    Result Date: 6/13/2023  EXAM: CT HEAD WO CONTRAST INDICATION: ams COMPARISON: None. CONTRAST: None. TECHNIQUE: Unenhanced CT of the head was performed using 5 mm images. Brain and bone windows were generated. Coronal and sagittal reformats. CT dose reduction was achieved through use of a standardized protocol tailored for this examination and automatic exposure control for dose modulation. FINDINGS: The ventricles and sulci are normal in size, shape and configuration. There is no significant white matter disease. There is no intracranial hemorrhage, extra-axial collection, or mass effect. The basilar cisterns are open. No CT evidence of acute infarct. The bone windows demonstrate no abnormalities. The visualized portions of the paranasal sinuses and mastoid air cells are clear. Vascular calcification is noted. No acute abnormality.      XR CHEST

## 2023-06-19 NOTE — PROGRESS NOTES
Lorenzo received from [facility]. Patient in bed with sitter at bedside. Patient on room air, eyes closed, respirations unlabored at the time of handoff. Patient assessed due to difficulty breathing. Pt was found sitting up, coughing, Shortness of breath. Upon SpO2 reading <90%. Re attempted since extremities were cool. Given 4L nasal cannula    Breath sounds at later time,adventitious. Given albumin. Lulla  was contacted and respiratory. Patient given Lasix and to remain on NC. Upon O2 monitor repositioning sats were 100%. This rn responded to patient desating in 76s. Patient informed to keep oxygen in,,    This RN started another IV. Minimal urine output despite Lasix. Apprx <150ml total shift. No restraints used on patient throughout shift. Sitter remained at bedside.

## 2023-06-19 NOTE — PROGRESS NOTES
Per chart review, pt's sister still working on discharge plan and has reached out to the [place] regarding assistance with hospice. [facility] will continue to follow from a distance for a discharge plan.      Nasim Grayson RN  Clinical Manager  [place] [id number] [facility], [facility] [address]  [phone]  Email: [email or web address]

## 2023-06-19 NOTE — PROGRESS NOTES
[address] [phone]  [facility] [phone]     Palliative team, Dr. Brisa Fernandez MD and this LMSW attended to pt at bedside for follow up visit. Upon entry, pt laying in bed with eyes closed. Sitter at bedside reports he has been sleeping much of the day. She reports he did not eat much of his lunch. Pt opened eyes to loud verbal stimuli, but did not respond verbally to any greetings or questions. Pt did track MD with eyes. LMSW made telephone contact with pt's sister, Shailesh Shabazz, who reports they did an MPoA, when pt was at [facility]. Austin Moore RN did search in [facility], and found [id number] [facility]. LMSW confirmed Ms. Haro's desire to DC pt to facility on Hospice. LMSW asked if she wants to transition pt to comfort measures here in the hospital. Burdens and benefits of this discussed. Pt's sister, reports, yes, she does want to transition pt to comfort measures in hospital, as she feels it will cause him to be less agitated, if they stop doing labs and other tests. She further states, he loves ice cream, but not to give him too much at one time, as it gives him diarrhea. She reports Lactaid is helpful for this, if it's available. Ms. Suki Whatley reports she will be coming to hospital again by 06/23, but believes she can do docusign on her phone. LMSW obtained her phone number to attempt to send docusign. LMSW reiterated need to consult with an  to assist with getting guardianship, so can access pt's banking and other information to pay for care, as needed. Pt reports Cammy Polo, pt's executor of his will, is resistant to her doing so. LMSW informed her, she should consult with an , as there may be some reasonable concern there may be some attempted exploitation by outsiders. Chuy Zhao verbalized understanding. LMSW informed her will try to send docusign, but if unable to, she can sign the comfort post when she comes to see pt next time.   Chuy Zhao

## 2023-06-20 PROBLEM — Z51.5 COMFORT MEASURES ONLY STATUS: Status: ACTIVE | Noted: 2023-01-01

## 2023-06-20 NOTE — PROGRESS NOTES
RENAL PROGRESS NOTE    Patient now on comfort Care measures only  We would sign off    Bernardo De León MD

## 2023-06-20 NOTE — PROGRESS NOTES
Bereavement Note:     responded to the death of Mamadou Menezes. I called patient's sister Katerin Mariano. RN had informed her of his passing. She was surprised at [facility] quickly he went\" but was at peace about his passing. She had spoken to the Palliative Care team earlier today and had given paperwork to [address] to fax into the [facility] 3970. Paperwork was attempted to be scanned in but was unsuccessful x2. RN Supervisor is aware of this and will attempt to contact the [address] [place] [address] with patient's information. Patient is released by Linda Wayne and Sail[facility] and can go to the [facility] at this time. Date of Death: 23    Extended Emergency Contact Information  Primary Emergency Contact: Power Chavarria Phone: [phone]  Mobile Phone: [phone]  Relation: Brother/Sister      Home: [employer] (if accepted)    Chaplains will continue to follow family and will provide spiritual care as needed. [address], M.Div.  [facility], [facility]-  Board Certified   Board Certified Ethicist  Certified [facility]  [phone] - Office

## 2023-06-20 NOTE — PROGRESS NOTES
Physician Progress Note      PATIENT:               Celia Monae  CSN #:                  [id number]  :                       1940  ADMIT DATE:       2023 8:40 AM  [address] [place] DATE:  RESPONDING  PROVIDER #:        Jossie Bermudez MD          QUERY TEXT:    Pt admitted with Septic shock ,Acute renal failure and Atrial fibrillation   with RVR . Pt noted to have Dx of Dementia with documentation this admission   of  increased  confusion as well as pulling out devices , Pulling lines ,   tubes  and dressing equipment . If possible, please document in the progress   notes and discharge summary if you are evaluating and / or treating any of the   following: The medical record reflects the following:    Risk Factors: 79year old male hx of Afib, dementia, Parkinson, CAD on   brillenta admitted with worsening mentation and sepsis associated w/blackwood   catheter   from UTI and bladder mass. Clinical Indicators: ED MD PN--EMS from [address] and   Nursing for low urine output. Facility states he has shown decline in   alertness over the course of the past week    MD PN -creatinine 12.50 on admission  1 month ago his baseline creatinine was   at 1 .     RN PN Breath sounds at later time, adventitious. Given albumin. Dr Malka Sumner was contacted and respiratory. Patient given Lasix and to remain on   NC. Upon O2 monitor repositioning sats were 100%. .This Rn responded to patient   desating in 76s. Patient informed to keep oxygen in,, This RN started another   IV. Minimal urine output despite Lasix.       Treatment: BUE soft wrist restraints and sitter ordered , labs/VS monitored   carefully ,IV abx    Thank you  Nikole Scott RN CRCR LEONIDES  , [email or web address]  Options provided:  -- Metabolic encephalopathy  -- Metabolic encephalopathy superimposed on dementia dx and hx  -- Septic encephalopathy superimposed on dementia dx and hx  -- Toxic metabolic encephalopathy superimposed on

## 2023-06-20 NOTE — PROGRESS NOTES
[address] [facility] [phone]  [facility] [phone]     LMSW received VM from pt's sister, Katty Galvez, stating when she tries to open document in [place], it says cannot load document. Ms. Larisa Mccarty reports she can come here and sign it, as she is free this morning. LMSW informed her will come out to car when she arrives to have her sign POST. Ms. Larisa Mccarty reports she has documents to donate body to science completed for pt. LMSW informed her, can take and review the documents and place on pt's chart. LMSW provided [phone] for Ms. Haro to call upon arrival, so that LMSW can go out and meet with her. LMSW contacted [facility] office and spoke with Mariaa Grimaldo RN, informing her they will be receiving a call and to please text me on personal cell phone, when call received. All parties verbalized understanding of plan. LMSW met with pt's sister to obtain signature on POST this LMSW completed for DNR / DNI ([address] / NO FEEDING TUBES. POST reviewed, already signed by Dr. Sherrill Gill MD, pt's sister, Katty Galvez, signed on pt's behalf as MPoA. Pt's sister provided document for pt's request to donate his body at time of death. She requested LMSW review document. LMSW informed her, will review and let her know if anything needs to be corrected. This LMSW provided supportive counseling, encouraging pt's sister to ensure she is doing self care, and not over obliging herself to others. Pt's sister discussed some of the overwhelming things she is experiencing at this time. LMSW allowed her to openly talk through these issues. At conclusion of time together, pt's sister expressed thanks. Reports no further needs or concerns at this time. Thank you for this referral to Palliative Care. The palliative care team remains available to provide support to patient and their family.       CODE STATUS: DNR / DNI ([address]    Marce Vergara

## 2023-06-20 NOTE — PROGRESS NOTES
Pt pulling oxygen and o2 monitor off. Fighting the sitter when she is attempting to replace the equipment. Attempted to calmly reorient the patient to no avail. Contacting provider to make aware of pt condition and seek further orders.

## 2023-06-20 NOTE — PLAN OF CARE
Problem: Discharge Planning  Goal: Discharge to home or other facility with appropriate resources  Outcome: Progressing     Problem: Safety - Adult  Goal: Free from fall injury  Outcome: Progressing     Problem: Pain  Goal: Verbalizes/displays adequate comfort level or baseline comfort level  Outcome: Progressing     Problem: Safety - Medical Restraint  Goal: Remains free of injury from restraints (Restraint for Interference with Medical Device)  Description: INTERVENTIONS:  1. Determine that other, less restrictive measures have been tried or would not be effective before applying the restraint  2. Evaluate the patient's condition at the time of restraint application  3. Inform patient/family regarding the reason for restraint  4. Q2H: Monitor safety, psychosocial status, comfort, nutrition and hydration  Outcome: Progressing     Problem: Skin/Tissue Integrity  Goal: Absence of new skin breakdown  Description: 1. Monitor for areas of redness and/or skin breakdown  2. Assess vascular access sites hourly  3. Every 4-6 hours minimum:  Change oxygen saturation probe site  4. Every 4-6 hours:  If on nasal continuous positive airway pressure, respiratory therapy assess nares and determine need for appliance change or resting period.   Outcome: Progressing     Problem: Chronic Conditions and Co-morbidities  Goal: Patient's chronic conditions and co-morbidity symptoms are monitored and maintained or improved  Outcome: Progressing

## 2023-06-20 NOTE — DISCHARGE SUMMARY
Discharge Summary    Patient: Rigo Medeiros MRN: [id number]  CSN: [id number]    YOB: 1940  Age: 79 y.o. Sex: male    DOA: 6/13/2023 LOS:  LOS: 7 days   Discharge Date:      Primary Care Provider:  Radha Sunshine MD    Admission Diagnoses: Shortness of breath [R06.02]  UTI (urinary tract infection) [N39.0]  Atrial fibrillation with rapid ventricular response ([address]) [I48.91]  Cause of death   Active Hospital Problems    Diagnosis Date Noted    Urge incontinence [N39.41] 04/10/2019     Priority: Medium    Comfort measures only status [Z51.5] 06/20/2023    Fungemia [B49] 06/19/2023    PAF (paroxysmal atrial fibrillation) ([address]) [I48.0] 06/19/2023    Bladder mass [N32.89] 06/19/2023    MICHAEL (acute kidney injury) ([address]) [N17.9] 06/19/2023    Pulmonary edema [J81.1] 06/19/2023    Chronic combined systolic and diastolic CHF (congestive heart failure) ([address]) [I50.42] 06/19/2023    DNR (do not resuscitate) Paloma Cespedes 06/19/2023    Hypotension [I95.9] 06/14/2023    Troponin level elevated [R77.8] 06/14/2023    Palliative care encounter [Z51.5] 06/14/2023    Goals of care, counseling/discussion [Z71.89] 06/14/2023    DNR (do not resuscitate) discussion [Z71.89] 06/14/2023    Poor prognosis [Z78.9] 06/14/2023    UTI (urinary tract infection) [N39.0] 06/13/2023    Sepsis ([address]) [A41.9] 06/13/2023    DVT (deep venous thrombosis) ([address]) [I82.409] 02/12/2015       Discharge Condition: stable     Discharge Medications:   none       Procedures : none     Consults: nephrologist , cardiologist urologist , pulmonologist         Admission HPI :  Rigo Medeiros is a 79 y.o.  male who, Parkinson's, coronary artery disease with stenting back in 2018 on Brilinta, cardiomyopathy presents from [address] with complaints of increased lethargy and confusion as well as generalized weakness. Patient had been having his diuretics adjusted by his nursing home team and was found to have worsening labs.   His creatinine was extremity venous bilateral    Result Date: 6/14/2023    No evidence of deep vein thrombosis in the right lower extremity. No evidence of deep vein thrombosis in the left lower extremity.              [facility]     CC: Kathy Canales MD

## 2023-06-20 NOTE — PROGRESS NOTES
Cardiology Progress Note        Patient: Yovanny Finley        Sex: male          DOA: 6/13/2023  YOB: 1940      Age:  79 y.o.        LOS:  LOS: 6 days       Patient seen and examined, chart reviewed    Assessment/Plan     Patient Active Problem List   Diagnosis    GERD (gastroesophageal reflux disease)    Essential hypertension    Urge incontinence    Genital herpes    DVT (deep venous thrombosis) ([facility])    Venous (peripheral) insufficiency    History of ST elevation myocardial infarction (STEMI)    Urinary frequency    Weak urinary stream    MCI (mild cognitive impairment)    Enlarged prostate with lower urinary tract symptoms (LUTS)    History of complex partial epilepsy    Infected sebaceous cyst of skin    Idiopathic peripheral neuropathy    Chronic indwelling Verduzco catheter    Chest pain    Shortness of breath    Atrial fibrillation with rapid ventricular response ([facility])    Nocturia    Urinary retention    UTI (urinary tract infection)    Sepsis ([facility])    Hypotension    Troponin level elevated    Palliative care encounter    Goals of care, counseling/discussion    DNR (do not resuscitate) discussion    Poor prognosis    Fungemia    PAF (paroxysmal atrial fibrillation) ([facility])    Bladder mass    MICHAEL (acute kidney injury) ([address])    Pulmonary edema    Chronic combined systolic and diastolic CHF (congestive heart failure) ([address])    DNR (do not resuscitate)      Atrial fibrillation with rapid ventricular rate     Echocardiogram reported      Left Ventricle: Normal left ventricular systolic function with a visually estimated EF of 40 - 45%. Left ventricle size is normal. Normal wall thickness. See diagram for wall motion findings. Grade III diastolic dysfunction with increased LAP. Right Ventricle: Hyperdynamic systolic function. Global longitudinal strain is -30.5%. RV Peak S' is 16 cm/s.  TDI systolic excursion is normal. RV free wall strain is normal.    Aortic Valve: bicarb-citric acid (EFFER-K) effervescent tablet 40 mEq  40 mEq Oral PRN    Or    potassium chloride 10 mEq/100 mL IVPB (Peripheral Line)  10 mEq IntraVENous PRN    pantoprazole (PROTONIX) 40 mg in sodium chloride (PF) 0.9 % 10 mL injection  40 mg IntraVENous Daily    acetaminophen (TYLENOL) tablet 650 mg  650 mg Oral Q6H PRN    Or    acetaminophen (TYLENOL) suppository 650 mg  650 mg Rectal Q6H PRN    sodium chloride flush 0.9 % injection 5-40 mL  5-40 mL IntraVENous 2 times per day    atorvastatin (LIPITOR) tablet 80 mg  80 mg Oral Nightly    carbidopa-levodopa (SINEMET)  MG per tablet 1 tablet  1 tablet Oral BID               Lab/Data Reviewed:       No results for input(s): WBC, HGB, HCT, PLT in the last 72 hours.     Recent Labs     06/17/23  0500 06/17/23  1013 06/18/23  1055 06/19/23  1106     --  142 141   K 3.3* 4.5 4.4 5.0   *  --  113* 111   CO2 23  --  20* 17*   BUN 77*  --  92* 107*         Signed By: Lindsay Chaney MD     June 19, 2023

## 2023-06-20 NOTE — PROGRESS NOTES
1620 Received call from [facility] at [address] [place] [address] she is releasing for ALL donations from [employer] and [employer] at this time.

## 2023-06-20 NOTE — PROGRESS NOTES
Palliative Medicine follow-up progress note  [facility]: [phone]  [facility]: [phone]     Patient Name: Jessica Sanchez  YOB: 1940    Date of Initial Consult: 6/14/2023  Date of service:6/20/23  Reason for Consult: goals of care discussion/symptoms management  Requesting Provider:  Lorin Medellin MD  Primary Care Physician: Russell Donaldson MD      SUMMARY:     Jessica Sanchez is a 79 y.o. with a past history of A-fib, coronary artery disease, DVT, BPH s/p Verduzco, who was admitted on 6/13/2023 from long-term care nursing facility with a diagnosis of sepsis, UTI and acute on chronic kidney disease. Patient initially presented from nursing home for further evaluation of increasing lethargy and confusion with generalized weakness. Patient was on diuretics reportedly and was found to have worsening renal function. In the emergency room, patient had minimal urinary output and CT scan was done showed air-fluid level in the bladder. Urology and nephrology services were consulted. Patient had ultrasound done showed posterior mass and urinary bladder. Patient was also noted to have hypotension was started on IV pressor and admitted to ICU. He was also started on IV antibiotic for sepsis management. Due to intermittent agitation, patient was started on soft restraints. Current medical issues leading to Palliative Medicine involvement include: To establish goals of care. 6/20/2023:  Patient is lying in bed with eyes closed, no response to verbal stimuli. Sitter at bedside reports frequent groaning. Patient with labored breathing on 4 L NC and appears in some distress. 6/19/23: Patient is awake but unable to provide any medical histories. Patient has a sitter at bedside. He is very slow in response. Denies for having any pain. Appetite remains poor per sitter. No agitation per sitter.     6/16/23: Patient was seen in presence of palliative care point.  His blood pressure has been stable. His kidney function is improving but patient has elevated troponin. Patient was also found out to have yeast in blood culture and ID has been consulted. Patient is also seen by urologist for bladder mass evaluation. With multiple comorbidities and patient's advanced dementia, it will be in patient's best interest to serve him with hospice services. However patient is unable to participate in decision-making process due to his current mental status. Our team will reach out to patient's sister to set up a meeting tomorrow to discuss goals of care. ICU team has been notified about it. We got a paperwork related to DNR  form nursing home. Plan: Patient is DNR/DNI, limited interventions, will set up family meeting tomorrow. 6/14/2023: Patient was seen in presence of palliative care RN. Patient is very lethargic and only tries to open his eyes on tactile commands. Patient was placed into soft wrist wrist restraints earlier today as he was interfering with his medical treatment per nursing. Patient is currently on IV pressors and IV antibiotics. Patient's creatinine is improving and has been followed by urologist and nephrologist.  Patient is unable to participate in his decision-making process at this point due to his current mental status. Patient does not have AMD on the file but it has been listed that patient has a sister, Ms. Tyesha Fowler to be an emergency . We called patient's sister Ms. Tyesha Fowler and introduced ourselves. Patient is a , does not have any kids, has some half and stepsiblings but no one is in touch with the patient. Patient has been living at [facility] for more than a month after he had a hospitalization at [facility] due to encephalopathy, MICHAEL, clavicular fracture after a fall. Patient used to live with her prior to that hospitalization but he was cognitively declining.   There is no AMD on the file but Ms.

## 2023-06-20 NOTE — PROGRESS NOTES
Contacted provider about pt's low BP. Reviewed medication and new orders obtained. Bed is in lowest position.     Video monitoring in progress

## 2023-06-20 NOTE — PROGRESS NOTES
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 Please call back if questions or concerns. Thanks. Cindy Kolb MD  [facility])  Office: [phone] -Option #8  Office fax:  [phone]

## 2023-06-21 LAB
BACTERIA SPEC CULT: NORMAL
BACTERIA SPEC CULT: NORMAL
SERVICE CMNT-IMP: NORMAL
SERVICE CMNT-IMP: NORMAL
